# Patient Record
Sex: MALE | Race: WHITE | NOT HISPANIC OR LATINO | Employment: FULL TIME | URBAN - METROPOLITAN AREA
[De-identification: names, ages, dates, MRNs, and addresses within clinical notes are randomized per-mention and may not be internally consistent; named-entity substitution may affect disease eponyms.]

---

## 2017-09-21 ENCOUNTER — ALLSCRIPTS OFFICE VISIT (OUTPATIENT)
Dept: OTHER | Facility: OTHER | Age: 52
End: 2017-09-21

## 2017-09-21 DIAGNOSIS — E78.5 HYPERLIPIDEMIA: ICD-10-CM

## 2018-01-13 VITALS
SYSTOLIC BLOOD PRESSURE: 118 MMHG | HEIGHT: 66 IN | WEIGHT: 188 LBS | BODY MASS INDEX: 30.22 KG/M2 | DIASTOLIC BLOOD PRESSURE: 82 MMHG

## 2018-01-16 NOTE — PROGRESS NOTES
Assessment    1  Encounter for preventive health examination (V70 0) (Z00 00)   2  Encounter for screening colonoscopy (V76 51) (Z12 11)    Plan  Elevated liver enzymes, Encounter for screening colonoscopy    · COLONOSCOPY; Status:Active; Requested for:09Nov2016;    · 1 Laura Newell MD, Gaye Witt  (Gastroenterology) Physician Referral  Consult  Status: Active   Requested for: [id number]  Care Summary provided  : Yes  Health Maintenance    · (1) COMPREHENSIVE METABOLIC PANEL; Status:Active; Requested for:09Nov2016;    · * XR RIBS LEFT W PA CHEST MIN 3 VIEWS; Status:Active; Requested for:09Nov2016;    · * XR SPINE THORACIC 3 VIEW; Status:Active; Requested for:09Nov2016; Health Maintenance, Encounter for prostate cancer screening    · (1) PSA (SCREEN) (Dx V76 44 Screen for Prostate Cancer); Status:Active; Requested  for:09Nov2016; Health Maintenance, Hyperlipidemia    · (1) LIPID PANEL, FASTING; Status:Active; Requested for:09Nov2016;   Living will, counseling/discussion, Screening for depression    · We recommend that you create an advance directive ; Status:Complete - Retrospective  By Protocol Authorization;   Done: [id number]  Screening for depression    · *VB-Depression Screening; Status:Complete - Retrospective By Protocol Authorization;    Done: [id number] 07:43AM    Discussion/Summary  Impression: health maintenance visit  Currently, he eats a healthy diet  Prostate cancer screening: PSA was ordered  Testicular cancer screening: the risks and benefits of testicular cancer screening were discussed and self testicular exam technique was taught  Colorectal cancer screening: the risks and benefits of colorectal cancer screening were discussed  Chief Complaint    1  Back Pain    History of Present Illness  HM, Adult Male: The patient is being seen for a health maintenance evaluation  The last health maintenance visit was 2 year(s) ago  Social History: He is unmarried   Work status: working full time and occupation: restaurant owner  The patient has never smoked cigarettes  He reports rare alcohol use  He has never used illicit drugs  General Health: The patient's health since the last visit is described as good  He has regular dental visits  He denies vision problems  He denies hearing loss  Lifestyle:  He consumes a diverse and healthy diet  He does not have any weight concerns  He exercises regularly  He does not use tobacco  He consumes alcohol  He denies drug use  Reproductive health:  the patient is sexually active  birth control is not being practiced  He denies erectile dysfunction  Screening: cancer screening reviewed and current  Prostate cancer screening includes prostate-specific antigen testing last year  Testicular cancer screening includes monthly self testicular examinations  Colorectal cancer screening includes no previous screening  metabolic screening reviewed and current  Metabolic screening includes lipid profile performed within the past five years, glucose screening performed last year and thyroid function test performed last year  risk screening reviewed and current  Cardiovascular risk factors: no hypertension, no diabetes, no high LDL cholesterol, no low HDL cholesterol, no stress, no obesity, no tobacco use, no illicit drug use, no sedentary lifestyle and no family history of cardiovascular disease  General health risks: no elevated prostate-specific antigen, no undescended testis, no family history of prostate cancer, no previous colon polyps, no inflammatory bowel disease, no osteoporosis risk factors, no asbestos exposure, no radiation exposure and no occupational exposure to     Safety elements used: seat belt, bicycle helmet, safe driving habits, sunscreen, smoke detector, carbon monoxide detector, hot water temperature less than 120 degrees F, bathroom grab bars, fall prevention measures, CPR training for the patient and CPR training for household members, but no motorcycle helmet, no gun trigger locks and no gun safe  Back Pain: ALEX RUEDA presents with complaints of back pain  Associated symptoms include no spasm, no stiffness, no fever, no night sweats, no abdominal pain, no general malaise, no weight loss and no arm numbness  Review of Systems    Constitutional: No fever or chills, feels well, no tiredness, no recent weight gain or weight loss  Eyes: No complaints of eye pain, no red eyes, no discharge from eyes, no itchy eyes  ENT: no complaints of earache, no hearing loss, no nosebleeds, no nasal discharge, no sore throat, no hoarseness  Cardiovascular: No complaints of slow heart rate, no fast heart rate, no chest pain, no palpitations, no leg claudication, no lower extremity  Respiratory: No complaints of shortness of breath, no wheezing, no cough, no SOB on exertion, no orthopnea or PND  Gastrointestinal: No complaints of abdominal pain, no constipation, no nausea or vomiting, no diarrhea or bloody stools  Genitourinary: No complaints of dysuria, no incontinence, no hesitancy, no nocturia, no genital lesion, no testicular pain  Musculoskeletal: Point tenderness in the left posterior thorax at about the level of the fourth rib  Integumentary: No complaints of skin rash or skin lesions, no itching, no skin wound, no dry skin  Neurological: No compliants of headache, no confusion, no convulsions, no numbness or tingling, no dizziness or fainting, no limb weakness, no difficulty walking  Psychiatric: Is not suicidal, no sleep disturbances, no anxiety or depression, no change in personality, no emotional problems  Endocrine: No complaints of proptosis, no hot flashes, no muscle weakness, no erectile dysfunction, no deepening of the voice, no feelings of weakness  Hematologic/Lymphatic: No complaints of swollen glands, no swollen glands in the neck, does not bleed easily, no easy bruising  ROS reviewed  Active Problems    1   Elevated liver enzymes (790 5) (R74 8)   2  High risk medication use (V58 69) (Z79 899)   3  Hyperlipidemia (272 4) (E78 5)   4  Living will, counseling/discussion (V65 49) (Z71 89)   5  Need for influenza vaccination (V04 81) (Z23)   6  Pain of left side of body (780 96) (R52)   7  Postprandial abdominal bloating (787 3) (R14 0)   8  Screening for depression (V79 0) (Z13 89)    Past Medical History    · History of Atypical chest pain (786 59) (R07 89)    Surgical History    · History Of Prior Surgery    Family History  Mother    · Family history of hypertension (V17 49) (Z82 49)  Family History    · Family history of hyperlipidemia (V18 19) (Z83 49)    Social History    · Former smoker (V15 82) (R06 393)   · No living will   · Stopped Drinking Alcohol    Current Meds   1  Simvastatin 20 MG Oral Tablet; Take 1 tablet daily; Therapy: 92HAO0301 to (Evaluate:78Nym8416)  Requested for: 70VGF2308; Last   Rx:58Lwo1671 Ordered    Allergies    1  Aspirin TABS   2  Penicillins    Vitals   Recorded: 28FLW7886 15:67JM   Systolic 532, LUE, Sitting   Diastolic 76, LUE, Sitting   Height 5 ft 6 in   Weight 189 lb 4 00 oz   BMI Calculated 30 55   BSA Calculated 1 96     Physical Exam    Constitutional   General appearance: No acute distress, well appearing and well nourished  Eyes   Conjunctiva and lids: No swelling, erythema, or discharge  Pupils and irises: Equal, round and reactive to light  Ears, Nose, Mouth, and Throat   External inspection of ears and nose: Normal     Otoscopic examination: Tympanic membrance translucent with normal light reflex  Canals patent without erythema  Oropharynx: Normal with no erythema, edema, exudate or lesions  Pulmonary   Respiratory effort: No increased work of breathing or signs of respiratory distress  Auscultation of lungs: Clear to auscultation  Cardiovascular   Palpation of heart: Normal PMI, no thrills      Auscultation of heart: Normal rate and rhythm, normal S1 and S2, without murmurs  Examination of extremities for edema and/or varicosities: Normal     Abdomen   Abdomen: Non-tender, no masses  Liver and spleen: No hepatomegaly or splenomegaly  Lymphatic   Palpation of lymph nodes in neck: No lymphadenopathy  Musculoskeletal   Gait and station: Normal     Digits and nails: Normal without clubbing or cyanosis  Inspection/palpation of joints, bones, and muscles: Normal     Skin   Skin and subcutaneous tissue: Normal without rashes or lesions  Neurologic   Cranial nerves: Cranial nerves 2-12 intact  Reflexes: 2+ and symmetric  Sensation: No sensory loss      Psychiatric   Orientation to person, place and time: Normal     Mood and affect: Normal        Results/Data  *VB-Depression Screening [id number] 07:43AM Cory Desouza     Test Name Result Flag Reference   Depression Scale Result      Depression Screen - Negative For Symptoms       Signatures   Electronically signed by : Kimberli Bailey DO; Nov 9 2016  8:30AM EST                       (Author)

## 2018-01-17 NOTE — RESULT NOTES
Verified Results  * XR RIBS LEFT W PA CHEST MIN 3 VIEWS [id number] 08:47AM Oziel Rebolledo    Order Number: [id number]     Test Name Result Flag Reference   XR RIBS LEFT W PA CHEST MIN 3 VIEWS (Report)     LEFT RIBS AND CHEST     INDICATION: Left rib pain  COMPARISON: March 4, 2015     VIEWS: Frontal chest and 3 views left hemithorax; 4 images     FINDINGS:     The cardiomediastinal silhouette is unremarkable  Lungs are clear  No pleural effusions  There is no pneumothorax  No rib fractures are identified  Mild degenerative changes, thoracolumbar spine  IMPRESSION:     1  No active pulmonary disease  2  No evidence of rib fractures  Workstation performed: [id number]     Signed by:   Dona Jacobs MD   11/9/16

## 2018-02-13 ENCOUNTER — TELEPHONE (OUTPATIENT)
Dept: FAMILY MEDICINE CLINIC | Facility: CLINIC | Age: 53
End: 2018-02-13

## 2018-02-13 NOTE — TELEPHONE ENCOUNTER
Pt called about lab results which can now be found under Media - Nothing was outstanding in the results except for the ALT which was elevated 60 (range 9-46)    DOES DR HAVE ANY MESSAGE OR RECOMMENDATION FOR PT?

## 2018-02-14 NOTE — TELEPHONE ENCOUNTER
Labs were all good of the minor elevation in ALT is nothing to be concerned about so he should continue same meds repeat these lab values in a year

## 2018-03-02 DIAGNOSIS — E78.5 HYPERLIPIDEMIA, UNSPECIFIED HYPERLIPIDEMIA TYPE: Primary | ICD-10-CM

## 2018-03-02 RX ORDER — SIMVASTATIN 20 MG
1 TABLET ORAL DAILY
COMMUNITY
Start: 2011-05-26 | End: 2018-03-02 | Stop reason: SDUPTHER

## 2018-03-02 RX ORDER — SIMVASTATIN 20 MG
20 TABLET ORAL DAILY
Qty: 90 TABLET | Refills: 1 | Status: SHIPPED | OUTPATIENT
Start: 2018-03-02 | End: 2018-11-21 | Stop reason: SDUPTHER

## 2018-03-27 ENCOUNTER — APPOINTMENT (EMERGENCY)
Dept: CT IMAGING | Facility: HOSPITAL | Age: 53
End: 2018-03-27
Payer: COMMERCIAL

## 2018-03-27 ENCOUNTER — HOSPITAL ENCOUNTER (EMERGENCY)
Facility: HOSPITAL | Age: 53
Discharge: HOME | End: 2018-03-27
Attending: EMERGENCY MEDICINE
Payer: COMMERCIAL

## 2018-03-27 ENCOUNTER — APPOINTMENT (EMERGENCY)
Dept: RADIOLOGY | Facility: HOSPITAL | Age: 53
End: 2018-03-27
Payer: COMMERCIAL

## 2018-03-27 VITALS
HEART RATE: 64 BPM | HEIGHT: 65 IN | BODY MASS INDEX: 32.36 KG/M2 | SYSTOLIC BLOOD PRESSURE: 128 MMHG | WEIGHT: 194.2 LBS | DIASTOLIC BLOOD PRESSURE: 82 MMHG | OXYGEN SATURATION: 96 % | RESPIRATION RATE: 18 BRPM | TEMPERATURE: 98.6 F

## 2018-03-27 DIAGNOSIS — J32.9 SINUSITIS: ICD-10-CM

## 2018-03-27 DIAGNOSIS — R42 VERTIGO: Primary | ICD-10-CM

## 2018-03-27 LAB
ALBUMIN SERPL BCP-MCNC: 4.2 G/DL (ref 3.5–5)
ALP SERPL-CCNC: 66 U/L (ref 46–116)
ALT SERPL W P-5'-P-CCNC: 98 U/L (ref 12–78)
ANION GAP SERPL CALCULATED.3IONS-SCNC: 8 MMOL/L (ref 4–13)
AST SERPL W P-5'-P-CCNC: 43 U/L (ref 5–45)
BASOPHILS # BLD AUTO: 0.02 THOUSANDS/ΜL (ref 0–0.1)
BASOPHILS NFR BLD AUTO: 0 % (ref 0–1)
BILIRUB SERPL-MCNC: 0.7 MG/DL (ref 0.2–1)
BUN SERPL-MCNC: 14 MG/DL (ref 5–25)
CALCIUM SERPL-MCNC: 9.2 MG/DL (ref 8.3–10.1)
CHLORIDE SERPL-SCNC: 106 MMOL/L (ref 100–108)
CO2 SERPL-SCNC: 28 MMOL/L (ref 21–32)
CREAT SERPL-MCNC: 1.06 MG/DL (ref 0.6–1.3)
EOSINOPHIL # BLD AUTO: 0.07 THOUSAND/ΜL (ref 0–0.61)
EOSINOPHIL NFR BLD AUTO: 1 % (ref 0–6)
ERYTHROCYTE [DISTWIDTH] IN BLOOD BY AUTOMATED COUNT: 13.5 % (ref 11.6–15.1)
GFR SERPL CREATININE-BSD FRML MDRD: 81 ML/MIN/1.73SQ M
GLUCOSE SERPL-MCNC: 103 MG/DL (ref 65–140)
HCT VFR BLD AUTO: 45.9 % (ref 36.5–49.3)
HGB BLD-MCNC: 16.8 G/DL (ref 12–17)
LYMPHOCYTES # BLD AUTO: 1.07 THOUSANDS/ΜL (ref 0.6–4.47)
LYMPHOCYTES NFR BLD AUTO: 12 % (ref 14–44)
MCH RBC QN AUTO: 31.5 PG (ref 26.8–34.3)
MCHC RBC AUTO-ENTMCNC: 36.6 G/DL (ref 31.4–37.4)
MCV RBC AUTO: 86 FL (ref 82–98)
MONOCYTES # BLD AUTO: 0.36 THOUSAND/ΜL (ref 0.17–1.22)
MONOCYTES NFR BLD AUTO: 4 % (ref 4–12)
NEUTROPHILS # BLD AUTO: 7.57 THOUSANDS/ΜL (ref 1.85–7.62)
NEUTS SEG NFR BLD AUTO: 83 % (ref 43–75)
PLATELET # BLD AUTO: 180 THOUSANDS/UL (ref 149–390)
PMV BLD AUTO: 10.1 FL (ref 8.9–12.7)
POTASSIUM SERPL-SCNC: 4.7 MMOL/L (ref 3.5–5.3)
PROT SERPL-MCNC: 7.8 G/DL (ref 6.4–8.2)
RBC # BLD AUTO: 5.33 MILLION/UL (ref 3.88–5.62)
SODIUM SERPL-SCNC: 142 MMOL/L (ref 136–145)
TROPONIN I SERPL-MCNC: <0.02 NG/ML
WBC # BLD AUTO: 9.09 THOUSAND/UL (ref 4.31–10.16)

## 2018-03-27 PROCEDURE — 93005 ELECTROCARDIOGRAM TRACING: CPT | Performed by: EMERGENCY MEDICINE

## 2018-03-27 PROCEDURE — 70498 CT ANGIOGRAPHY NECK: CPT

## 2018-03-27 PROCEDURE — 84484 ASSAY OF TROPONIN QUANT: CPT | Performed by: EMERGENCY MEDICINE

## 2018-03-27 PROCEDURE — 71045 X-RAY EXAM CHEST 1 VIEW: CPT

## 2018-03-27 PROCEDURE — 70496 CT ANGIOGRAPHY HEAD: CPT

## 2018-03-27 PROCEDURE — 99285 EMERGENCY DEPT VISIT HI MDM: CPT

## 2018-03-27 PROCEDURE — 85025 COMPLETE CBC W/AUTO DIFF WBC: CPT | Performed by: EMERGENCY MEDICINE

## 2018-03-27 PROCEDURE — 80053 COMPREHEN METABOLIC PANEL: CPT | Performed by: EMERGENCY MEDICINE

## 2018-03-27 PROCEDURE — 96374 THER/PROPH/DIAG INJ IV PUSH: CPT

## 2018-03-27 RX ORDER — ONDANSETRON 4 MG/1
4 TABLET, ORALLY DISINTEGRATING ORAL EVERY 8 HOURS PRN
Qty: 20 TABLET | Refills: 0 | Status: SHIPPED | OUTPATIENT
Start: 2018-03-27 | End: 2018-05-07

## 2018-03-27 RX ORDER — MECLIZINE HYDROCHLORIDE 25 MG/1
25 TABLET ORAL 4 TIMES DAILY PRN
Qty: 30 TABLET | Refills: 0 | Status: SHIPPED | OUTPATIENT
Start: 2018-03-27 | End: 2018-05-07

## 2018-03-27 RX ORDER — AZITHROMYCIN 250 MG/1
250 TABLET, FILM COATED ORAL DAILY
Qty: 4 TABLET | Refills: 0 | Status: SHIPPED | OUTPATIENT
Start: 2018-03-27 | End: 2018-03-31

## 2018-03-27 RX ORDER — MECLIZINE HCL 12.5 MG/1
25 TABLET ORAL ONCE
Status: COMPLETED | OUTPATIENT
Start: 2018-03-27 | End: 2018-03-27

## 2018-03-27 RX ORDER — AZITHROMYCIN 250 MG/1
500 TABLET, FILM COATED ORAL ONCE
Status: COMPLETED | OUTPATIENT
Start: 2018-03-27 | End: 2018-03-27

## 2018-03-27 RX ORDER — ONDANSETRON 2 MG/ML
4 INJECTION INTRAMUSCULAR; INTRAVENOUS ONCE
Status: COMPLETED | OUTPATIENT
Start: 2018-03-27 | End: 2018-03-27

## 2018-03-27 RX ADMIN — IOHEXOL 100 ML: 350 INJECTION, SOLUTION INTRAVENOUS at 13:38

## 2018-03-27 RX ADMIN — AZITHROMYCIN 500 MG: 250 TABLET, FILM COATED ORAL at 15:49

## 2018-03-27 RX ADMIN — MECLIZINE 25 MG: 12.5 TABLET ORAL at 12:03

## 2018-03-27 RX ADMIN — ONDANSETRON 4 MG: 2 INJECTION INTRAMUSCULAR; INTRAVENOUS at 12:10

## 2018-03-27 NOTE — ED PROVIDER NOTES
History  Chief Complaint   Patient presents with    Dizziness     Pt reports dizziness when turning his head  Pt reports nausea  Denies headache     73-year-old male right-hand-dominant was in usual state of health when he went to bed last night  He awoke in the middle the night noted that when he turned his head removed his eyes he had onset of a spinning sensation denies any falls or trauma no headache  No visual disturbance  No recent upper respiratory complaints such as congestion cough or sore throat  Denies any prior history of vertigo  He was mildly nauseated no vomiting  No complaints of problems with speech or hearing  No chest pain  Slight neck disccomfort  He denies any back pain  No chest pain he has noted that he has had some slight shortness of breath  No abdominal pain no back pain no numbness tingling or weakness no difficulties with urination no fever or chills  Prior to Admission Medications   Prescriptions Last Dose Informant Patient Reported? Taking?   simvastatin (ZOCOR) 20 mg tablet 3/26/2018 at Unknown time  No Yes   Sig: Take 1 tablet (20 mg total) by mouth daily   Patient taking differently: Take 20 mg by mouth daily at bedtime        Facility-Administered Medications: None       Past Medical History:   Diagnosis Date    Hyperlipidemia        Past Surgical History:   Procedure Laterality Date    NO PAST SURGERIES         History reviewed  No pertinent family history  I have reviewed and agree with the history as documented  Social History   Substance Use Topics    Smoking status: Former Smoker    Smokeless tobacco: Never Used    Alcohol use No        Review of Systems   Constitutional: Negative for activity change, chills, diaphoresis and fever  HENT: Negative for congestion, ear pain, hearing loss, rhinorrhea, sneezing, sore throat, trouble swallowing and voice change  Eyes: Negative for discharge and visual disturbance     Respiratory: Negative for cough and shortness of breath  Cardiovascular: Negative for chest pain and leg swelling  Gastrointestinal: Positive for nausea  Negative for abdominal pain, blood in stool, diarrhea and vomiting  Endocrine: Negative for polyuria  Genitourinary: Negative for dysuria, frequency and urgency  Musculoskeletal: Positive for neck pain (mild)  Negative for back pain, myalgias and neck stiffness  Skin: Negative for rash  Neurological: Positive for dizziness  Negative for facial asymmetry, numbness and headaches  Hematological: Negative for adenopathy  Psychiatric/Behavioral: Negative for confusion  All other systems reviewed and are negative  Physical Exam  ED Triage Vitals [03/27/18 1106]   Temperature Pulse Respirations Blood Pressure SpO2   98 6 °F (37 °C) 64 17 144/86 97 %      Temp Source Heart Rate Source Patient Position - Orthostatic VS BP Location FiO2 (%)   Temporal Monitor Lying Left arm --      Pain Score       No Pain           Orthostatic Vital Signs  Vitals:    03/27/18 1400 03/27/18 1430 03/27/18 1500 03/27/18 1600   BP: 125/82 118/78 115/77 128/82   Pulse: 71 60 56 64   Patient Position - Orthostatic VS: Lying Lying Sitting Lying       Physical Exam   Constitutional: He is oriented to person, place, and time  He appears well-developed  HENT:   Head: Normocephalic  Right Ear: External ear normal    Left Ear: External ear normal    Nose: Nose normal    Mouth/Throat: Oropharynx is clear and moist  No oropharyngeal exudate  Auditory canals free of wax; TMS pale bilaterally   Eyes: Conjunctivae and EOM are normal  Pupils are equal, round, and reactive to light  Right eye exhibits no discharge  Left eye exhibits no discharge  No scleral icterus  No nystagmus   Neck: Normal range of motion  Neck supple  No bruits   Cardiovascular: Normal rate, regular rhythm and normal heart sounds  No murmur heard  Pulmonary/Chest: Effort normal and breath sounds normal  No respiratory distress   He has no wheezes  Abdominal: Soft  Bowel sounds are normal  He exhibits no distension  There is no tenderness  There is no rebound and no guarding  Back no midline or CVA tenderness   Lymphadenopathy:     He has no cervical adenopathy  Neurological: He is alert and oriented to person, place, and time  He displays normal reflexes  No cranial nerve deficit or sensory deficit  He exhibits normal muscle tone  Coordination normal    Skin: Skin is warm and dry  Capillary refill takes less than 2 seconds  No erythema  Psychiatric: He has a normal mood and affect  Vitals reviewed  ED Medications  Medications   ondansetron (ZOFRAN) injection 4 mg (4 mg Intravenous Given 3/27/18 1210)   meclizine (ANTIVERT) tablet 25 mg (25 mg Oral Given 3/27/18 1203)   iohexol (OMNIPAQUE) 350 MG/ML injection (MULTI-DOSE) 100 mL (100 mL Intravenous Given 3/27/18 1338)   azithromycin (ZITHROMAX) tablet 500 mg (500 mg Oral Given 3/27/18 1549)       Diagnostic Studies  Results Reviewed     Procedure Component Value Units Date/Time    Troponin I [[id number]]  (Normal) Collected:  03/27/18 1209    Lab Status:  Final result Specimen:  Blood from Arm, Right Updated:  03/27/18 1234     Troponin I <0 02 ng/mL     Narrative:         [employer] Chemistry analyzer 99% cutoff is > 0 04 ng/mL in network labs    o cTnI 99% cutoff is useful only when applied to patients in the clinical setting of myocardial ischemia  o cTnI 99% cutoff should be interpreted in the context of clinical history, ECG findings and possibly cardiac imaging to establish correct diagnosis  o cTnI 99% cutoff may be suggestive but clearly not indicative of a coronary event without the clinical setting of myocardial ischemia      Comprehensive metabolic panel [[id number]]  (Abnormal) Collected:  03/27/18 1209    Lab Status:  Final result Specimen:  Blood from Arm, Right Updated:  03/27/18 1232     Sodium 142 mmol/L      Potassium 4 7 mmol/L      Chloride 106 mmol/L      CO2 28 mmol/L      Anion Gap 8 mmol/L      BUN 14 mg/dL      Creatinine 1 06 mg/dL      Glucose 103 mg/dL      Calcium 9 2 mg/dL      AST 43 U/L      ALT 98 (H) U/L      Alkaline Phosphatase 66 U/L      Total Protein 7 8 g/dL      Albumin 4 2 g/dL      Total Bilirubin 0 70 mg/dL      eGFR 81 ml/min/1 73sq m     Narrative:         National Kidney Disease Education Program recommendations are as follows:  GFR calculation is accurate only with a steady state creatinine  Chronic Kidney disease less than 60 ml/min/1 73 sq  meters  Kidney failure less than 15 ml/min/1 73 sq  meters  CBC and differential [[id number]]  (Abnormal) Collected:  03/27/18 1209    Lab Status:  Final result Specimen:  Blood from Arm, Right Updated:  03/27/18 1218     WBC 9 09 Thousand/uL      RBC 5 33 Million/uL      Hemoglobin 16 8 g/dL      Hematocrit 45 9 %      MCV 86 fL      MCH 31 5 pg      MCHC 36 6 g/dL      RDW 13 5 %      MPV 10 1 fL      Platelets 337 Thousands/uL      Neutrophils Relative 83 (H) %      Lymphocytes Relative 12 (L) %      Monocytes Relative 4 %      Eosinophils Relative 1 %      Basophils Relative 0 %      Neutrophils Absolute 7 57 Thousands/µL      Lymphocytes Absolute 1 07 Thousands/µL      Monocytes Absolute 0 36 Thousand/µL      Eosinophils Absolute 0 07 Thousand/µL      Basophils Absolute 0 02 Thousands/µL                  CTA head and neck with and without contrast   Final Result by Venus Staley MD (03/27 0132)   No focal brain parenchymal abnormalities      No evidence of significant stenosis, dissection or occlusion involving visualized cerebral arteries or cervical carotid and vertebral segments      Right ethmoid and frontal sinus disease including probable right frontal air-fluid level                  Workstation performed: [id number]         XR chest 1 view portable   Final Result by JOHN Monte MD (03/27 5441)      No acute cardiopulmonary disease              Workstation performed: [id number] Procedures  ECG 12 Lead Documentation  Date/Time: 3/27/2018 12:09 PM  Performed by: Renee Harris by: Radha Triana     Indications / Diagnosis:  Vertigo  ECG reviewed by me, the ED Provider: yes    Patient location:  ED  Previous ECG:     Previous ECG:  Unavailable  Rate:     ECG rate:  58    ECG rate assessment: bradycardic    Rhythm:     Rhythm: sinus bradycardia    QRS:     QRS axis:  Normal  Comments:      Nonspecific twave changes           Phone Contacts  ED Phone Contact    ED Course  ED Course              NIH Stroke Scale    Flowsheet Row Most Recent Value   Level of Consciousness (1a )  0 Filed at: 03/27/2018 1153   LOC Questions (1b )  0 Filed at: 03/27/2018 1153   LOC Commands (1c )  0 Filed at: 03/27/2018 1153   Best Gaze (2 )  0 Filed at: 03/27/2018 1153   Visual (3 )  0 Filed at: 03/27/2018 1153   Facial Palsy (4 )  0 Filed at: 03/27/2018 1153   Motor Arm, Left (5a )  0 Filed at: 03/27/2018 1153   Motor Arm, Right (5b )  0 Filed at: 03/27/2018 1153   Motor Leg, Left (6a )  0 Filed at: 03/27/2018 1153   Motor Leg, Right (6b )  0 Filed at: 03/27/2018 1153   Limb Ataxia (7 )  0 Filed at: 03/27/2018 1153   Sensory (8 )  0 Filed at: 03/27/2018 1153   Best Language (9 )  0 Filed at: 03/27/2018 1153   Dysarthria (10 )  0 Filed at: 03/27/2018 1153   Extinction and Inattention (11 ) (Formerly Neglect)  0 Filed at: 03/27/2018 1153   Total  0 Filed at: 03/27/2018 1153                        MDM  Number of Diagnoses or Management Options  Diagnosis management comments: Mdm: vertigo, CVA less likely    CritCare Time    Disposition  Final diagnoses:   Vertigo   Sinusitis - frontal and ethmoid right     Time reflects when diagnosis was documented in both MDM as applicable and the Disposition within this note     Time User Action Codes Description Comment    3/27/2018  3:31 PM Bill Crumble Add [R42] Vertigo     3/27/2018  3:31 PM Bill Crumble Add [J32 9] Sinusitis 3/27/2018  3:32 PM Alba Black Modify [J32 9] Sinusitis frontal and ethmoid right      ED Disposition     ED Disposition Condition Comment    Discharge  Darwin Arellano discharge to home/self care  Condition at discharge: Good        Follow-up Information     Follow up With Specialties Details Why [address], DO Family Medicine In 1 week not improviing [address] [address]  [address]  [phone]          Discharge Medication List as of 3/27/2018  3:39 PM      START taking these medications    Details   azithromycin (ZITHROMAX Z-DAVIAN) 250 mg tablet Take 1 tablet (250 mg total) by mouth daily for 4 days, Starting Tue 3/27/2018, Until Sat 3/31/2018, Print      meclizine (ANTIVERT) 25 mg tablet Take 1 tablet (25 mg total) by mouth 4 (four) times a day as needed for dizziness, Starting Tue 3/27/2018, Print      ondansetron (ZOFRAN-ODT) 4 mg disintegrating tablet Take 1 tablet (4 mg total) by mouth every 8 (eight) hours as needed for nausea or vomiting for up to 20 doses, Starting Tue 3/27/2018, Print         CONTINUE these medications which have NOT CHANGED    Details   simvastatin (ZOCOR) 20 mg tablet Take 1 tablet (20 mg total) by mouth daily, Starting Fri 3/2/2018, Normal             Outpatient Discharge Orders  PT eval and treat   Standing Status: Future  Standing Exp   Date: 03/28/19         ED Provider  Electronically Signed by           Jg Wolf MD  03/27/18 8921

## 2018-03-27 NOTE — DISCHARGE INSTRUCTIONS
Vertigo   AMBULATORY CARE:   Vertigo  is a condition that causes you to feel dizzy  You may feel that you or everything around you is moving or spinning  You may also feel like you are being pulled down or toward your side  Common symptoms that may happen with vertigo:   · Nausea or vomiting    · Trouble with your balance    · Sensitivity to light or sound    · Weakness, slurred speech, problems seeing or moving, or increased sleepiness    · Facial weakness and headache    · Hearing loss, ear fullness or pain, or hearing ringing sounds    · Fast, uncontrolled movement of your eyes  Seek care immediately if:   · You have a headache and a stiff neck  · You have shaking chills and a fever  · You vomit over and over with no relief  · You have blood, pus, or fluid coming out of your ears  · You are confused  Contact your healthcare provider if:   · Your symptoms do not get better with treatment  · You have questions about your condition or care  Treatment for vertigo  may include resting in bed or avoid certain activities for a time  You may need to decrease or stop taking medicines that are causing your vertigo  Medicines may also be prescribed to help relieve your symptoms  Manage your symptoms:   · Do not drive , walk without help, or operate heavy machinery when you are dizzy  · Move slowly  when you move from one position to another position  Get up slowly from sitting or lying down  Sit or lie down right away if you feel dizzy  · Drink plenty of liquids  Liquids help prevent dehydration  Ask how much liquid to drink each day and which liquids are best for you  · Vestibular and balance rehabilitation therapy (VBRT)  is used to teach you exercises to improve your balance and strength  These exercises may help decrease your vertigo and improve your balance  Ask for more information about this therapy    Follow up with your healthcare provider as directed:  Write down your questions so you remember to ask them during your visits  © 2017 [address] [employer] Information is for End User's use only and may not be sold, redistributed or otherwise used for commercial purposes  All illustrations and images included in CareNotes® are the copyrighted property of [employer]  or [employer]  The above information is an  only  It is not intended as medical advice for individual conditions or treatments  Talk to your doctor, nurse or pharmacist before following any medical regimen to see if it is safe and effective for you  Sinusitis   WHAT YOU NEED TO KNOW:   Sinusitis is inflammation or infection of your sinuses  It is most often caused by a virus  Acute sinusitis may last up to 12 weeks  Chronic sinusitis lasts longer than 12 weeks  Recurrent sinusitis means you have 4 or more times in 1 year  DISCHARGE INSTRUCTIONS:   Return to the emergency department if:   · Your eye and eyelid are red, swollen, and painful  · You cannot open your eye  · You have vision changes, such as double vision  · Your eyeball bulges out or you cannot move your eye  · You are more sleepy than normal, or you notice changes in your ability to think, move, or talk  · You have a stiff neck, a fever, or a bad headache  · You have swelling of your forehead or scalp  Contact your healthcare provider if:   · Your symptoms do not improve after 3 days  · Your symptoms do not go away after 10 days  · You have nausea and are vomiting  · Your nose is bleeding  · You have questions or concerns about your condition or care  Medicines: Your symptoms may go away on their own  Your healthcare provider may recommend watchful waiting for up to 10 days before starting antibiotics  You may  need any of the following:  · Acetaminophen  decreases pain and fever  It is available without a doctor's order  Ask how much to take and how often to take it   Follow directions  Read the labels of all other medicines you are using to see if they also contain acetaminophen, or ask your doctor or pharmacist  Acetaminophen can cause liver damage if not taken correctly  Do not use more than 4 grams (4,000 milligrams) total of acetaminophen in one day  · NSAIDs , such as ibuprofen, help decrease swelling, pain, and fever  This medicine is available with or without a doctor's order  NSAIDs can cause stomach bleeding or kidney problems in certain people  If you take blood thinner medicine, always ask your healthcare provider if NSAIDs are safe for you  Always read the medicine label and follow directions  · Nasal steroid sprays  may help decrease inflammation in your nose and sinuses  · Decongestants  help reduce swelling and drain mucus in the nose and sinuses  They may help you breathe easier  · Antihistamines  help dry mucus in the nose and relieve sneezing  · Antibiotics  help treat or prevent a bacterial infection  · Take your medicine as directed  Contact your healthcare provider if you think your medicine is not helping or if you have side effects  Tell him or her if you are allergic to any medicine  Keep a list of the medicines, vitamins, and herbs you take  Include the amounts, and when and why you take them  Bring the list or the pill bottles to follow-up visits  Carry your medicine list with you in case of an emergency  Self-care:   · Rinse your sinuses  Use a sinus rinse device to rinse your nasal passages with a saline (salt water) solution or distilled water  Do not use tap water  This will help thin the mucus in your nose and rinse away pollen and dirt  It will also help reduce swelling so you can breathe normally  Ask your healthcare provider how often to do this  · Breathe in steam   Heat a bowl of water until you see steam  Lean over the bowl and make a tent over your head with a large towel  Breathe deeply for about 20 minutes   Be careful not to get too close to the steam or burn yourself  Do this 3 times a day  You can also breathe deeply when you take a hot shower  · Sleep with your head elevated  Place an extra pillow under your head before you go to sleep to help your sinuses drain  · Drink liquids as directed  Ask your healthcare provider how much liquid to drink each day and which liquids are best for you  Liquids will thin the mucus in your nose and help it drain  Avoid drinks that contain alcohol or caffeine  · Do not smoke, and avoid secondhand smoke  Nicotine and other chemicals in cigarettes and cigars can make your symptoms worse  Ask your healthcare provider for information if you currently smoke and need help to quit  E-cigarettes or smokeless tobacco still contain nicotine  Talk to your healthcare provider before you use these products  Prevent the spread of germs that cause sinusitis:  Wash your hands often with soap and water  Wash your hands after you use the bathroom, change a child's diaper, or sneeze  Wash your hands before you prepare or eat food  Follow up with your healthcare provider as directed: You may be referred to an ear, nose, and throat specialist  Write down your questions so you remember to ask them during your visits  © 2017 [facility] Information is for End User's use only and may not be sold, redistributed or otherwise used for commercial purposes  All illustrations and images included in CareNotes® are the copyrighted property of [employer]  or [employer]  The above information is an  only  It is not intended as medical advice for individual conditions or treatments  Talk to your doctor, nurse or pharmacist before following any medical regimen to see if it is safe and effective for you    No driving until free of sx for 48 hours  You tube epley maneuver      Benign Paroxysmal Positional Vertigo   WHAT YOU NEED TO KNOW:   BPPV is an inner ear condition that causes you to suddenly feel dizzy  Benign means it is not serious or life-threatening  BPPV is caused by a problem with the nerves and structure of your inner ear  BPPV happens when small pieces of calcium break loose and lump together in one of your inner ear canals  DISCHARGE INSTRUCTIONS:   Return to the emergency department if:   · You fall during a BPPV episode and are injured  · You have a severe headache that does not go away  · You have new changes in your vision or feel weak or confused  · You have problems hearing, or you have ringing or buzzing in your ears  Contact your healthcare provider if:   · Your BPPV symptoms do not go away or they return  · You have problems with your balance, or you are falling often  · You have new or increased nausea or vomiting with vertigo  · You feel anxious or depressed and do not want to leave your home  · You have questions or concerns about your condition or care  Medicines:   · Medicines  may be recommended or prescribed to treat dizziness or nausea  · Take your medicine as directed  Contact your healthcare provider if you think your medicine is not helping or if you have side effects  Tell him of her if you are allergic to any medicine  Keep a list of the medicines, vitamins, and herbs you take  Include the amounts, and when and why you take them  Bring the list or the pill bottles to follow-up visits  Carry your medicine list with you in case of an emergency  Prevent your symptoms:   · Try to avoid sudden head movements  Stand up and lie down slowly  · Raise and support your head when you lie down  Place pillows under your upper back and head or rest in a recliner  · Change your position often when you are lying down  Try not to lie with your head on the same side for long periods of time  Roll over slowly  · Wear protective gear  when you ride a bike or play sports   A helmet helps protect your head from injury  Follow up with your healthcare provider as directed: You may need to return in 1 month to check the progress of your treatment  Write down your questions so you remember to ask them during your visits  © 2017 [address] [employer] Information is for End User's use only and may not be sold, redistributed or otherwise used for commercial purposes  All illustrations and images included in CareNotes® are the copyrighted property of [employer]  or [employer]  The above information is an  only  It is not intended as medical advice for individual conditions or treatments  Talk to your doctor, nurse or pharmacist before following any medical regimen to see if it is safe and effective for you

## 2018-03-28 LAB
ATRIAL RATE: 58 BPM
P AXIS: 18 DEGREES
PR INTERVAL: 172 MS
QRS AXIS: 17 DEGREES
QRSD INTERVAL: 84 MS
QT INTERVAL: 404 MS
QTC INTERVAL: 396 MS
T WAVE AXIS: -11 DEGREES
VENTRICULAR RATE: 58 BPM

## 2018-03-28 PROCEDURE — 93010 ELECTROCARDIOGRAM REPORT: CPT | Performed by: INTERNAL MEDICINE

## 2018-03-29 ENCOUNTER — VBI (OUTPATIENT)
Dept: ADMINISTRATIVE | Facility: OTHER | Age: 53
End: 2018-03-29

## 2018-03-29 NOTE — TELEPHONE ENCOUNTER
Darwingemini Arellano    ED Visit Information     Ed visit date: 3/27/18  Diagnosis Description: Vertigo; Sinusitis [frontal and ethmoid right]   In Network? Yes [facility]  Discharge status: Home  Discharged with meds ? Yes  Number of ED visits to date: 1  ED Severity:3     Outreach Information    Outreach successful: Yes 2  Date letter mailed:n/a  Date Finalized:3/29/18    Care Coordination    Follow up appointment with pcp: no declined  Transportation issues ? No    [employer] type:  3 Day Outreach  Patient refsued the answer questions:  No  Emergent necessity warranted by diagnosis:  Yes  [facility] PCP:  Yes  Transportation:  Friend/Family Transport  Called PCP first?:  No  Feels able to call PCP for urgent problems ?:  Yes  Understands what emergencies can be handled by PCP ?:  Yes  Ever any problems getting appointment with PCP for minor emergency/urgency problems?:  Yes  Practice Contacted Patient ?:  No  Pt had ED follow up with pcp/staff ?:  No    Reason Patient went to ED instead of Urgent Care or PCP?:  Proximity  Urgent care Education?:  Yes  Spoke with Ari Luo today 3/29/18,he stated that he is feeling better today, declined f/u with pcp  He also stated the ED was closer rather than Urgent care and he just needed to be seen due to his condition at the time

## 2018-05-07 ENCOUNTER — OFFICE VISIT (OUTPATIENT)
Dept: FAMILY MEDICINE CLINIC | Facility: CLINIC | Age: 53
End: 2018-05-07
Payer: COMMERCIAL

## 2018-05-07 VITALS
DIASTOLIC BLOOD PRESSURE: 70 MMHG | WEIGHT: 198.6 LBS | HEIGHT: 65 IN | BODY MASS INDEX: 33.09 KG/M2 | SYSTOLIC BLOOD PRESSURE: 120 MMHG

## 2018-05-07 DIAGNOSIS — H81.10 BENIGN PAROXYSMAL POSITIONAL VERTIGO, UNSPECIFIED LATERALITY: Primary | ICD-10-CM

## 2018-05-07 PROCEDURE — 99213 OFFICE O/P EST LOW 20 MIN: CPT | Performed by: PHYSICIAN ASSISTANT

## 2018-05-07 PROCEDURE — 3008F BODY MASS INDEX DOCD: CPT | Performed by: PHYSICIAN ASSISTANT

## 2018-05-07 NOTE — PROGRESS NOTES
Assessment/Plan:    Based on Darwin' symptoms and previously negative studies, recommending vestibular therapy for BPPV  He is in agreement  Diagnoses and all orders for this visit:    Benign paroxysmal positional vertigo, unspecified laterality  -     Ambulatory referral to Physical Therapy; Future          Subjective:      Patient ID: Gus Tesfaye is a 48 y o  male  Darwin is here today for continuing complaints of dizziness  Was at [facility] 3/27/18 and diagnosed with Vertigo  He had negative labs, CXR, EKG, and CTA head/neck  Given a prescription for meclizine and zofran  He never had to use the Zofran, and tried to use meclizine twice without relief  His mother and sister have H/O vertigo  Dizziness   This is a new problem  The current episode started more than 1 month ago  The problem occurs daily  The problem has been unchanged  Associated symptoms include vertigo  Pertinent negatives include no abdominal pain, anorexia, arthralgias, change in bowel habit, chest pain, chills, congestion, coughing, diaphoresis, fatigue, fever, headaches, joint swelling, myalgias, nausea, neck pain, numbness, rash, sore throat, swollen glands, urinary symptoms, visual change, vomiting or weakness  Exacerbated by: position changes or tilting head backwards  Treatments tried: meclizine  The treatment provided mild relief  The following portions of the patient's history were reviewed and updated as appropriate:   He  has a past medical history of Hyperlipidemia  He   Patient Active Problem List    Diagnosis Date Noted    Elevated liver enzymes 03/05/2015    Hyperlipidemia 06/07/2012     He  has a past surgical history that includes No past surgeries  His family history includes Hyperlipidemia in his family; Hypertension in his mother  He  reports that he has quit smoking  He has never used smokeless tobacco  He reports that he does not drink alcohol or use drugs    Current Outpatient Prescriptions   Medication Sig Dispense Refill    simvastatin (ZOCOR) 20 mg tablet Take 1 tablet (20 mg total) by mouth daily (Patient taking differently: Take 20 mg by mouth daily at bedtime  ) 90 tablet 1     No current facility-administered medications for this visit  Current Outpatient Prescriptions on File Prior to Visit   Medication Sig    simvastatin (ZOCOR) 20 mg tablet Take 1 tablet (20 mg total) by mouth daily (Patient taking differently: Take 20 mg by mouth daily at bedtime  )    [DISCONTINUED] meclizine (ANTIVERT) 25 mg tablet Take 1 tablet (25 mg total) by mouth 4 (four) times a day as needed for dizziness    [DISCONTINUED] ondansetron (ZOFRAN-ODT) 4 mg disintegrating tablet Take 1 tablet (4 mg total) by mouth every 8 (eight) hours as needed for nausea or vomiting for up to 20 doses     No current facility-administered medications on file prior to visit  He is allergic to aspirin and penicillins       Review of Systems   Constitutional: Negative for chills, diaphoresis, fatigue and fever  HENT: Negative for congestion, ear pain, postnasal drip, rhinorrhea, sinus pain, sinus pressure, sore throat and tinnitus  Respiratory: Negative for cough, shortness of breath and wheezing  Cardiovascular: Negative for chest pain and palpitations  Gastrointestinal: Negative for abdominal pain, anorexia, change in bowel habit, constipation, diarrhea, nausea and vomiting  Musculoskeletal: Negative for arthralgias, joint swelling, myalgias and neck pain  Skin: Negative for rash  Neurological: Positive for dizziness and vertigo  Negative for weakness, numbness and headaches  Objective:      /70 (BP Location: Left arm, Patient Position: Sitting)   Ht 5' 5" (1 651 m)   Wt 90 1 kg (198 lb 9 6 oz)   BMI 33 05 kg/m²          Physical Exam   Constitutional: He is oriented to person, place, and time  He appears well-developed and well-nourished  No distress     HENT:   Head: Normocephalic and atraumatic  Right Ear: Hearing, tympanic membrane, external ear and ear canal normal    Left Ear: Hearing, tympanic membrane, external ear and ear canal normal    Mouth/Throat: Uvula is midline, oropharynx is clear and moist and mucous membranes are normal    Eyes: Conjunctivae are normal  Right eye exhibits no discharge  Left eye exhibits no discharge  No scleral icterus  Neck: Neck supple  Carotid bruit is not present  No thyromegaly present  Cardiovascular: Normal rate, regular rhythm and normal heart sounds  No murmur heard  Pulmonary/Chest: Effort normal and breath sounds normal  No respiratory distress  He has no wheezes  Musculoskeletal: He exhibits no edema  Lymphadenopathy:     He has no cervical adenopathy  Neurological: He is alert and oriented to person, place, and time  Skin: Skin is warm and dry  No rash noted  He is not diaphoretic  No erythema  Psychiatric: He has a normal mood and affect  His behavior is normal  Judgment and thought content normal    Vitals reviewed

## 2018-05-21 ENCOUNTER — EVALUATION (OUTPATIENT)
Dept: PHYSICAL THERAPY | Facility: CLINIC | Age: 53
End: 2018-05-21
Payer: COMMERCIAL

## 2018-05-21 DIAGNOSIS — H81.11 BPPV (BENIGN PAROXYSMAL POSITIONAL VERTIGO), RIGHT: Primary | ICD-10-CM

## 2018-05-21 PROCEDURE — 95992 CANALITH REPOSITIONING PROC: CPT | Performed by: PHYSICAL THERAPIST

## 2018-05-21 PROCEDURE — G8981 BODY POS CURRENT STATUS: HCPCS | Performed by: PHYSICAL THERAPIST

## 2018-05-21 PROCEDURE — G8982 BODY POS GOAL STATUS: HCPCS | Performed by: PHYSICAL THERAPIST

## 2018-05-21 PROCEDURE — 97161 PT EVAL LOW COMPLEX 20 MIN: CPT | Performed by: PHYSICAL THERAPIST

## 2018-05-21 NOTE — PROGRESS NOTES
PT Evaluation     Today's date: 2018  Patient name: Janet Simon  : 1965  MRN: [id number]  Referring provider: Mar Garcia DO  Dx:   Encounter Diagnosis     ICD-10-CM    1  BPPV (benign paroxysmal positional vertigo), right H81 11                   Assessment  Impairments: activity intolerance    Assessment details: Vestibular Evaluation      Concurrent Complaints:  ____ Hearing Loss (Right/Left/Bilateral)  __X__ Aural Fullness (Right/Left/Bilateral)  ____ Tinnitus (Right/Left/Bilateral)  __X__ Nausea, Vomiting  ____ Altered Vision  ____ Poor Concentration  ____ Peripheral Neuropathy  ____ Headache  ____ Falls or Near Falls  ____     Memory Loss  ____ Cervical Pain    Symptom Rating Visual Analog Scales  Vertigo  Dysequilibrium  Oscillopsia        Oculomotor Function  Spontaneous Nystagmus Room Light:Negative    Gaze Holding Nystagmus Room Light:  Negative      Smooth Pursuit/VOR:  Normal      Positional Testing:  Vertebral Artery Screen:  Negative    Hallpike-Rozina Right:  Geotrophic nystagmus   Comment:  present initially, resolves following canalith repositioning treatment  Treatment included Epley maneuver x2 to address R anterior canalithiasis  HEP provided for continued home treatment          Understanding of Dx/Px/POC: good  Goals  STG: to be achieved in 4 weeks   1) Independent in basic Home Exercise Program for balance   2) Pt reporting decrease in vertiginous symptoms > 50%     LTG: to be achieved in 8 weeks   1) Pt demonstrating low fall risk on balance assessment    2) Pt reporting alleviation of vertiginous symptoms   3) Pt to be free from s/s of BPPV in bilateral canals   4) Pt free from falls    Plan  Planned therapy interventions: canalith repositioning, therapeutic activities and manual therapy  Frequency: 1x week  Duration in weeks: 4        Subjective Evaluation    History of Present Illness  Mechanism of injury: Patient reports his symptoms began as severe room-spinning dizziness upon waking on 3-27  He went to the ED where CVA and all other pathology were ruled out  He was given Meclizine and Zofran to manage symptoms  He attempted self treatment with positional movements without success     Not a recurrent problem   Pain  Current pain ratin  At best pain ratin  At worst pain ratin          Objective    Flowsheet Rows      Most Recent Value   PT/OT G-Codes   FOTO information reviewed  No   Assessment Type  Evaluation   G code set  Changing & Maintaining Body Position   Changing and Maintaining Body Position Current Status ()  CI   Changing and Maintaining Body Position Goal Status ()  CH          Precautions: n/a    Daily Treatment Diary       Exercise Diary         Epley Rx2, Lx1       Rozina Traore x1 R/L

## 2018-05-29 ENCOUNTER — OFFICE VISIT (OUTPATIENT)
Dept: PHYSICAL THERAPY | Facility: CLINIC | Age: 53
End: 2018-05-29
Payer: COMMERCIAL

## 2018-05-29 DIAGNOSIS — H81.11 BPPV (BENIGN PAROXYSMAL POSITIONAL VERTIGO), RIGHT: Primary | ICD-10-CM

## 2018-05-29 PROCEDURE — 97110 THERAPEUTIC EXERCISES: CPT | Performed by: PHYSICAL THERAPIST

## 2018-05-29 PROCEDURE — 97112 NEUROMUSCULAR REEDUCATION: CPT | Performed by: PHYSICAL THERAPIST

## 2018-05-29 PROCEDURE — 95992 CANALITH REPOSITIONING PROC: CPT | Performed by: PHYSICAL THERAPIST

## 2018-05-29 PROCEDURE — 97535 SELF CARE MNGMENT TRAINING: CPT | Performed by: PHYSICAL THERAPIST

## 2018-05-29 NOTE — PROGRESS NOTES
Today's date: 2018  Patient name: Triston Lucio  : 1965  MRN: [id number]  Referring provider: Tresa Flores DO  Dx:   Encounter Diagnosis     ICD-10-CM    1  BPPV (benign paroxysmal positional vertigo), right H81 11      Darwin will be discharged from outpatient physical therapy care due to expiration of plan of care  No objective measures updated, Darwin is not present at time of discharge  Subjective: Darwin reports that his symptoms have greatly improved but still persist        Objective: See treatment diary below      Assessment: Tolerated treatment well and tolerated additional interventions well today    Patient exhibited good technique with therapeutic exercises  Canalith positioning performed, education reviewed on neuro anatomy and pathology of BPPV  Pt ed provided on additions to HEP  Plan: Continue per plan of care        Precautions: n/a     Daily Treatment Diary         Exercise Diary           Epley Rx2, Lx1  Rx2, Lx1         Warren León x1 R/L            Wayne Jacomeoff   3x ea          VOR cx  U/D, R/L   1 min 2x ea                                                                                                                                                                                                                            Bike   L6 10'

## 2018-11-21 DIAGNOSIS — E78.5 HYPERLIPIDEMIA, UNSPECIFIED HYPERLIPIDEMIA TYPE: ICD-10-CM

## 2018-11-23 RX ORDER — SIMVASTATIN 20 MG
TABLET ORAL
Qty: 90 TABLET | Refills: 1 | Status: SHIPPED | OUTPATIENT
Start: 2018-11-23 | End: 2019-05-27 | Stop reason: SDUPTHER

## 2019-05-27 DIAGNOSIS — E78.5 HYPERLIPIDEMIA, UNSPECIFIED HYPERLIPIDEMIA TYPE: ICD-10-CM

## 2019-05-28 RX ORDER — SIMVASTATIN 20 MG
TABLET ORAL
Qty: 90 TABLET | Refills: 1 | Status: SHIPPED | OUTPATIENT
Start: 2019-05-28 | End: 2019-12-13 | Stop reason: SDUPTHER

## 2019-09-10 ENCOUNTER — OFFICE VISIT (OUTPATIENT)
Dept: FAMILY MEDICINE CLINIC | Facility: CLINIC | Age: 54
End: 2019-09-10
Payer: COMMERCIAL

## 2019-09-10 ENCOUNTER — APPOINTMENT (OUTPATIENT)
Dept: LAB | Facility: MEDICAL CENTER | Age: 54
End: 2019-09-10
Payer: COMMERCIAL

## 2019-09-10 VITALS
SYSTOLIC BLOOD PRESSURE: 114 MMHG | BODY MASS INDEX: 31.46 KG/M2 | WEIGHT: 188.8 LBS | HEIGHT: 65 IN | DIASTOLIC BLOOD PRESSURE: 82 MMHG

## 2019-09-10 DIAGNOSIS — Z12.5 SCREENING FOR PROSTATE CANCER: ICD-10-CM

## 2019-09-10 DIAGNOSIS — Z13.9 SCREENING FOR UNSPECIFIED CONDITION: ICD-10-CM

## 2019-09-10 DIAGNOSIS — Z12.11 SCREENING FOR COLON CANCER: ICD-10-CM

## 2019-09-10 DIAGNOSIS — Z00.00 ANNUAL PHYSICAL EXAM: Primary | ICD-10-CM

## 2019-09-10 DIAGNOSIS — E78.5 HYPERLIPIDEMIA, UNSPECIFIED HYPERLIPIDEMIA TYPE: ICD-10-CM

## 2019-09-10 DIAGNOSIS — Z13.29 SCREENING FOR HYPOTHYROIDISM: ICD-10-CM

## 2019-09-10 LAB
ALBUMIN SERPL BCP-MCNC: 4.8 G/DL (ref 3.5–5)
ALP SERPL-CCNC: 67 U/L (ref 46–116)
ALT SERPL W P-5'-P-CCNC: 40 U/L (ref 12–78)
ANION GAP SERPL CALCULATED.3IONS-SCNC: 7 MMOL/L (ref 4–13)
AST SERPL W P-5'-P-CCNC: 20 U/L (ref 5–45)
BILIRUB SERPL-MCNC: 0.6 MG/DL (ref 0.2–1)
BUN SERPL-MCNC: 15 MG/DL (ref 5–25)
CALCIUM SERPL-MCNC: 9 MG/DL (ref 8.3–10.1)
CHLORIDE SERPL-SCNC: 109 MMOL/L (ref 100–108)
CHOLEST SERPL-MCNC: 179 MG/DL (ref 50–200)
CO2 SERPL-SCNC: 25 MMOL/L (ref 21–32)
CREAT SERPL-MCNC: 0.93 MG/DL (ref 0.6–1.3)
ERYTHROCYTE [DISTWIDTH] IN BLOOD BY AUTOMATED COUNT: 13.2 % (ref 11.6–15.1)
GFR SERPL CREATININE-BSD FRML MDRD: 94 ML/MIN/1.73SQ M
GLUCOSE P FAST SERPL-MCNC: 87 MG/DL (ref 65–99)
HCT VFR BLD AUTO: 45.9 % (ref 36.5–49.3)
HDLC SERPL-MCNC: 76 MG/DL (ref 40–60)
HGB BLD-MCNC: 15.8 G/DL (ref 12–17)
LDLC SERPL CALC-MCNC: 96 MG/DL (ref 0–100)
MCH RBC QN AUTO: 30.9 PG (ref 26.8–34.3)
MCHC RBC AUTO-ENTMCNC: 34.4 G/DL (ref 31.4–37.4)
MCV RBC AUTO: 90 FL (ref 82–98)
NONHDLC SERPL-MCNC: 103 MG/DL
PLATELET # BLD AUTO: 175 THOUSANDS/UL (ref 149–390)
PMV BLD AUTO: 11.1 FL (ref 8.9–12.7)
POTASSIUM SERPL-SCNC: 4.3 MMOL/L (ref 3.5–5.3)
PROT SERPL-MCNC: 7.7 G/DL (ref 6.4–8.2)
PSA SERPL-MCNC: 0.8 NG/ML (ref 0–4)
RBC # BLD AUTO: 5.11 MILLION/UL (ref 3.88–5.62)
SODIUM SERPL-SCNC: 141 MMOL/L (ref 136–145)
TRIGL SERPL-MCNC: 37 MG/DL
TSH SERPL DL<=0.05 MIU/L-ACNC: 2.1 UIU/ML (ref 0.36–3.74)
WBC # BLD AUTO: 5.67 THOUSAND/UL (ref 4.31–10.16)

## 2019-09-10 PROCEDURE — 36415 COLL VENOUS BLD VENIPUNCTURE: CPT

## 2019-09-10 PROCEDURE — 80053 COMPREHEN METABOLIC PANEL: CPT

## 2019-09-10 PROCEDURE — 85027 COMPLETE CBC AUTOMATED: CPT

## 2019-09-10 PROCEDURE — 99396 PREV VISIT EST AGE 40-64: CPT | Performed by: PHYSICIAN ASSISTANT

## 2019-09-10 PROCEDURE — G0103 PSA SCREENING: HCPCS

## 2019-09-10 PROCEDURE — 80061 LIPID PANEL: CPT

## 2019-09-10 PROCEDURE — 84443 ASSAY THYROID STIM HORMONE: CPT

## 2019-09-10 NOTE — PROGRESS NOTES
ADULT ANNUAL [address]    NAME: Darwin Santos  AGE: 48 y o  SEX: male  : 1965     DATE: 9/10/2019     Assessment and Plan:     Problem List Items Addressed This Visit        Other    Hyperlipidemia    Relevant Orders    Lipid panel    Comprehensive metabolic panel    CBC and Platelet      Other Visit Diagnoses     Annual physical exam    -  Primary    Screening for colon cancer        Relevant Orders    Ambulatory referral to Gastroenterology    Screening for unspecified condition        Relevant Orders    Lipid panel    Comprehensive metabolic panel    TSH, 3rd generation    CBC and Platelet    Screening for hypothyroidism        Relevant Orders    TSH, 3rd generation    Screening for prostate cancer        Relevant Orders    PSA, Total Screen    BMI 31 0-31 9,adult              Immunizations and preventive care screenings were discussed with patient today  Appropriate education was printed on patient's after visit summary  Counseling:  Dental Health: discussed importance of regular tooth brushing, flossing, and dental visits  · Exercise: the importance of regular exercise/physical activity was discussed  Recommend exercise 3-5 times per week for at least 30 minutes  BMI Counseling: Body mass index is 31 42 kg/m²  The BMI is above normal  Nutrition recommendations include encouraging healthy choices of fruits and vegetables and moderation in carbohydrate intake  Exercise recommendations include exercising 3-5 times per week  No pharmacotherapy was ordered  Routine labs ordered  Return in 1 year (on 9/10/2020)  Chief Complaint:     Chief Complaint   Patient presents with    Well Check     Pt here today for annual well visit      History of Present Illness:     Adult Annual Physical   Patient here for a comprehensive physical exam  The patient reports no problems  He would like to get up to date with his routine labs  He is taking his simvastain daily without any problems  He is trying to watch his diet, but it has been a little lax recently  He denies any chest pains, shortness of breath, abdominal pains, bowel changes, or dysuria  He agrees to go for a screening colonoscopy  Diet and Physical Activity  · Diet/Nutrition: limited junk food  · Exercise: no formal exercise  Depression Screening  PHQ-9 Depression Screening    PHQ-9:    Frequency of the following problems over the past two weeks:       Little interest or pleasure in doing things:  0 - not at all  Feeling down, depressed, or hopeless:  0 - not at all  PHQ-2 Score:  0       General Health  · Sleep: sleeps well  · Hearing: normal - bilateral   · Vision: no vision problems, most recent eye exam >1 year ago and wears glasses  · Dental: regular dental visits   Health  · Symptoms include: none     Review of Systems:     Review of Systems   Constitutional: Negative for chills, diaphoresis, fatigue and fever  HENT: Negative for congestion, ear pain, postnasal drip, rhinorrhea, sneezing, sore throat and trouble swallowing  Eyes: Negative for pain and visual disturbance  Respiratory: Negative for apnea, cough, shortness of breath and wheezing  Cardiovascular: Negative for chest pain and palpitations  Gastrointestinal: Negative for abdominal pain, constipation, diarrhea, nausea and vomiting  Genitourinary: Negative for dysuria and hematuria  Musculoskeletal: Negative for arthralgias, gait problem and myalgias  Neurological: Negative for dizziness, syncope, weakness, light-headedness, numbness and headaches  Psychiatric/Behavioral: Negative for suicidal ideas  The patient is not nervous/anxious         Past Medical History:     Past Medical History:   Diagnosis Date    Hyperlipidemia       Past Surgical History:     Past Surgical History:   Procedure Laterality Date    NO PAST SURGERIES        Family History:     Family History Problem Relation Age of Onset    Hypertension Mother     Hyperlipidemia Family       Social History:     Social History     Socioeconomic History    Marital status: Single     Spouse name: None    Number of children: None    Years of education: None    Highest education level: None   Occupational History    None   Social Needs    Financial resource strain: None    Food insecurity:     Worry: None     Inability: None    Transportation needs:     Medical: None     Non-medical: None   Tobacco Use    Smoking status: Former Smoker    Smokeless tobacco: Never Used   Substance and Sexual Activity    Alcohol use: Yes     Frequency: Monthly or less    Drug use: No    Sexual activity: None   Lifestyle    Physical activity:     Days per week: None     Minutes per session: None    Stress: None   Relationships    Social connections:     Talks on phone: None     Gets together: None     Attends [employer] service: None     Active member of club or organization: None     Attends meetings of clubs or organizations: None     Relationship status: None    Intimate partner violence:     Fear of current or ex partner: None     Emotionally abused: None     Physically abused: None     Forced sexual activity: None   Other Topics Concern    None   Social History Narrative    No living will      Current Medications:     Current Outpatient Medications   Medication Sig Dispense Refill    simvastatin (ZOCOR) 20 mg tablet TAKE 1 TABLET BY MOUTH DAILY 90 tablet 1     No current facility-administered medications for this visit  Allergies: Allergies   Allergen Reactions    Aspirin     Penicillins       Physical Exam:     /82 (BP Location: Left arm, Patient Position: Sitting)   Ht 5' 5" (1 651 m)   Wt 85 6 kg (188 lb 12 8 oz)   BMI 31 42 kg/m²     Physical Exam   Constitutional: He is oriented to person, place, and time  He appears well-developed and well-nourished     HENT:   Head: Normocephalic and atraumatic  Right Ear: Tympanic membrane, external ear and ear canal normal    Left Ear: Tympanic membrane, external ear and ear canal normal    Nose: Nose normal    Mouth/Throat: Oropharynx is clear and moist and mucous membranes are normal  No oropharyngeal exudate, posterior oropharyngeal edema or posterior oropharyngeal erythema  Eyes: Pupils are equal, round, and reactive to light  EOM are normal    Neck: Normal range of motion  Neck supple  Cardiovascular: Normal rate, regular rhythm and normal heart sounds  Exam reveals no gallop and no friction rub  No murmur heard  Pulmonary/Chest: Effort normal and breath sounds normal  No respiratory distress  He has no wheezes  He has no rales  Abdominal: Soft  Bowel sounds are normal  There is no tenderness  There is no rebound and no guarding  Musculoskeletal: Normal range of motion  Lymphadenopathy:     He has no cervical adenopathy  Neurological: He is alert and oriented to person, place, and time  Skin: Skin is warm and dry  Psychiatric: He has a normal mood and affect  His behavior is normal  Judgment and thought content normal    Vitals reviewed        Youlanda Boast, PA-C  [facility]

## 2019-09-10 NOTE — PATIENT INSTRUCTIONS

## 2019-09-18 ENCOUNTER — PREP FOR PROCEDURE (OUTPATIENT)
Dept: GASTROENTEROLOGY | Facility: CLINIC | Age: 54
End: 2019-09-18

## 2019-09-18 ENCOUNTER — TELEPHONE (OUTPATIENT)
Dept: GASTROENTEROLOGY | Facility: HOSPITAL | Age: 54
End: 2019-09-18

## 2019-09-18 DIAGNOSIS — Z12.11 COLON CANCER SCREENING: Primary | ICD-10-CM

## 2019-09-18 NOTE — TELEPHONE ENCOUNTER
Patient and brother scheduled for colonoscopies on 11/5/19 with Dr Sidra Mendez at [facility]  Miralax/Dulcolax instructions and [employer] forms mailed to pt's home

## 2019-09-18 NOTE — TELEPHONE ENCOUNTER
09/18/19  Screened by: Viktoriya Toussaint MA    Referring Provider: Dr Wayne Hall  Pre- Screening: There is no height or weight on file to calculate BMI  Has patient been referred for a routine screening Colonoscopy? yes  Is the patient between 39-70 years old? yes    SCHEDULING STAFF   If the patient is between 45yrs-49yrs, please advise patient to confirm benefits/coverage with their insurance company for a routine screening colonoscopy, some insurance carriers will only cover at [id number] or older   If the patient is over 66years old, please schedule an office visit  Do you have any of the following symptoms? NO    Have you had a coronary or vascular stent within the last year? no    Have you had a heart attack or stroke in the last 6 months? no    Have you had intestinal surgery in the last 3 months? no    Do you have problems with: NO    Do you use: NO    Have you been hospitalized in the last Month? no    Have you been diagnosed with a bleeding disorder or anemia? no    Have you had chest pain (angina) or breathing problems  (COPD) in the last 3 months? no    Do you have any difficulty walking up a flight of stairs? no    Have you had Kidney failure or insufficiency? no    Have you had heart valve surgery? no    Are you confined to a wheelchair? no    Do you take NO    Have you been diagnosed with Diabetes or are you taking any   Diabetic medications? no    : If patient answers NO to medical questions, then schedule procedure  If patient answers YES to medical questions, then schedule office appointment  Previous Colonoscopy no  Date and Facility of last colonoscopy?  [address]    Comments: Kamini

## 2019-09-26 NOTE — TELEPHONE ENCOUNTER
Called and spoke to pt, he states that right now is not a good time for him and his brother, he wanted to know if he could reschedule for some time in February  I told him that unfortunately, our schedule is not out yet for the new year, but to give me a call some time in December, and I will be able to find them a date at that time

## 2019-12-13 DIAGNOSIS — E78.5 HYPERLIPIDEMIA, UNSPECIFIED HYPERLIPIDEMIA TYPE: ICD-10-CM

## 2019-12-13 RX ORDER — SIMVASTATIN 20 MG
TABLET ORAL
Qty: 90 TABLET | Refills: 1 | Status: SHIPPED | OUTPATIENT
Start: 2019-12-13 | End: 2020-07-02

## 2020-01-07 ENCOUNTER — TELEPHONE (OUTPATIENT)
Dept: FAMILY MEDICINE CLINIC | Facility: CLINIC | Age: 55
End: 2020-01-07

## 2020-01-10 ENCOUNTER — OFFICE VISIT (OUTPATIENT)
Dept: FAMILY MEDICINE CLINIC | Facility: CLINIC | Age: 55
End: 2020-01-10
Payer: COMMERCIAL

## 2020-01-10 VITALS
BODY MASS INDEX: 32.89 KG/M2 | OXYGEN SATURATION: 96 % | HEIGHT: 65 IN | SYSTOLIC BLOOD PRESSURE: 154 MMHG | DIASTOLIC BLOOD PRESSURE: 96 MMHG | WEIGHT: 197.4 LBS | HEART RATE: 70 BPM

## 2020-01-10 DIAGNOSIS — R23.8 SKIN SENSITIVITY: Primary | ICD-10-CM

## 2020-01-10 DIAGNOSIS — R03.0 ELEVATED BLOOD PRESSURE READING: ICD-10-CM

## 2020-01-10 PROCEDURE — 3008F BODY MASS INDEX DOCD: CPT | Performed by: PHYSICIAN ASSISTANT

## 2020-01-10 PROCEDURE — 1036F TOBACCO NON-USER: CPT | Performed by: PHYSICIAN ASSISTANT

## 2020-01-10 PROCEDURE — 99214 OFFICE O/P EST MOD 30 MIN: CPT | Performed by: PHYSICIAN ASSISTANT

## 2020-01-10 RX ORDER — PREDNISONE 10 MG/1
TABLET ORAL
Qty: 20 TABLET | Refills: 0 | Status: SHIPPED | OUTPATIENT
Start: 2020-01-10 | End: 2020-11-30

## 2020-01-10 NOTE — PROGRESS NOTES
Assessment/Plan:    Problem List Items Addressed This Visit     None      Visit Diagnoses     Skin sensitivity    -  Primary    Relevant Medications    predniSONE 10 mg tablet    Elevated blood pressure reading               Diagnoses and all orders for this visit:    Skin sensitivity  -     predniSONE 10 mg tablet; Days 1 and 2 take 4 tablets daily, days 3 and 4 take 3 tablets daily, days 5 and 6 and 2 tablets daily, days 7 and 8 take 1 tablet daily  Elevated blood pressure reading        Symptoms sound almost like HZV, however, there is not a rash present  Symptoms present x 3 weeks  Will try prednisone taper  I've asked Darwin to return in 2 weeks for recheck  Also, he will stop eating chocolate in order to lower is caffeine intake  Will recheck BP at that time and will initiate BP medication if needed  He will call GI to schedule his colonoscopy at his earliest convenience  Subjective:      Patient ID: Olivia Shahid is a 48 y o  male  Darwin is here today complaining of sensitive/painful skin along the L side of his neck and scalp x 3 weeks  The pain has not gotten better or worse in 3 weeks  There is not a rash present  He feels it occurred after turning his head and feeling a "small pinch "   Also of note, his BP is elevated today  He states he has been elevated at home as well  He admits to eating " a lot" of chocolate recently  He has paperwork and contact information at home to call GI and schedule his colonoscopy for colon CA screening  He will do this now that the holiday season is over  The following portions of the patient's history were reviewed and updated as appropriate:   He has a past medical history of Hyperlipidemia ,  does not have any pertinent problems on file  ,   has a past surgical history that includes No past surgeries  ,  family history includes Hyperlipidemia in his family; Hypertension in his mother  ,   reports that he has quit smoking   He has never used smokeless tobacco  He reports that he drinks alcohol  He reports that he does not use drugs  ,  is allergic to aspirin and penicillins     Current Outpatient Medications   Medication Sig Dispense Refill    simvastatin (ZOCOR) 20 mg tablet TAKE 1 TABLET BY MOUTH DAILY 90 tablet 1    predniSONE 10 mg tablet Days 1 and 2 take 4 tablets daily, days 3 and 4 take 3 tablets daily, days 5 and 6 and 2 tablets daily, days 7 and 8 take 1 tablet daily  20 tablet 0     No current facility-administered medications for this visit  Review of Systems   Constitutional: Negative for activity change, appetite change, chills, diaphoresis, fatigue, fever and unexpected weight change  HENT: Negative for congestion, ear pain, postnasal drip, rhinorrhea, sinus pressure, sinus pain, sneezing, sore throat, tinnitus and voice change  Eyes: Negative for pain, redness and visual disturbance  Respiratory: Negative for cough, chest tightness, shortness of breath and wheezing  Cardiovascular: Negative for chest pain, palpitations and leg swelling  Gastrointestinal: Negative for abdominal pain, blood in stool, constipation, diarrhea, nausea and vomiting  Genitourinary: Negative for difficulty urinating, dysuria, frequency, hematuria and urgency  Musculoskeletal: Negative for arthralgias, back pain, gait problem, joint swelling, myalgias, neck pain and neck stiffness  Skin: Negative for color change, pallor, rash and wound  Skin sensitivity along L side of neck   Neurological: Negative for dizziness, tremors, weakness, light-headedness and headaches  Psychiatric/Behavioral: Negative for dysphoric mood, self-injury, sleep disturbance and suicidal ideas  The patient is not nervous/anxious            Objective:  Vitals:    01/10/20 1007   BP: 154/96   BP Location: Left arm   Patient Position: Sitting   Pulse: 70   SpO2: 96%   Weight: 89 5 kg (197 lb 6 4 oz)   Height: 5' 5" (1 651 m)     Body mass index is 32 85 kg/m²  Physical Exam   Constitutional: He is oriented to person, place, and time  He appears well-developed and well-nourished  No distress  HENT:   Head: Normocephalic and atraumatic  Right Ear: Hearing, tympanic membrane, external ear and ear canal normal    Left Ear: Hearing, tympanic membrane, external ear and ear canal normal    Mouth/Throat: Uvula is midline, oropharynx is clear and moist and mucous membranes are normal  No oropharyngeal exudate  Eyes: Conjunctivae are normal  Right eye exhibits no discharge  Left eye exhibits no discharge  No scleral icterus  Neck: Neck supple  Carotid bruit is not present  No thyromegaly present  Cardiovascular: Normal rate, regular rhythm and normal heart sounds  No murmur heard  Pulmonary/Chest: Effort normal and breath sounds normal  No respiratory distress  He has no wheezes  Abdominal: Soft  Bowel sounds are normal  He exhibits no distension and no mass  There is no tenderness  There is no rebound and no guarding  Musculoskeletal: Normal range of motion  He exhibits no edema or tenderness  Lymphadenopathy:     He has no cervical adenopathy  Neurological: He is alert and oriented to person, place, and time  Skin: Skin is warm and dry  No rash noted  He is not diaphoretic  No erythema  Psychiatric: He has a normal mood and affect  His behavior is normal  Judgment and thought content normal    Vitals reviewed

## 2020-03-27 ENCOUNTER — TELEMEDICINE (OUTPATIENT)
Dept: FAMILY MEDICINE CLINIC | Facility: CLINIC | Age: 55
End: 2020-03-27
Payer: COMMERCIAL

## 2020-03-27 ENCOUNTER — TELEPHONE (OUTPATIENT)
Dept: FAMILY MEDICINE CLINIC | Facility: CLINIC | Age: 55
End: 2020-03-27

## 2020-03-27 VITALS
DIASTOLIC BLOOD PRESSURE: 80 MMHG | SYSTOLIC BLOOD PRESSURE: 140 MMHG | HEIGHT: 65 IN | BODY MASS INDEX: 32.65 KG/M2 | TEMPERATURE: 98 F | WEIGHT: 196 LBS

## 2020-03-27 DIAGNOSIS — J06.9 URI, ACUTE: Primary | ICD-10-CM

## 2020-03-27 PROCEDURE — G2012 BRIEF CHECK IN BY MD/QHP: HCPCS | Performed by: PHYSICIAN ASSISTANT

## 2020-03-27 PROCEDURE — 3008F BODY MASS INDEX DOCD: CPT | Performed by: PHYSICIAN ASSISTANT

## 2020-03-27 RX ORDER — AZITHROMYCIN 250 MG/1
TABLET, FILM COATED ORAL
Qty: 6 TABLET | Refills: 0 | Status: SHIPPED | OUTPATIENT
Start: 2020-03-27 | End: 2020-03-31

## 2020-03-27 NOTE — TELEPHONE ENCOUNTER
Runny nose - Scratchy throat & dry cough (Tickle in his tonsils) x 1 wk - NO fever or SOB - NO travel & in home x 8 days - No relief from Mucines    Allergies: Aspirin & Penicillin    Pharm: [facility]

## 2020-03-28 NOTE — PROGRESS NOTES
Virtual Brief Visit    Problem List Items Addressed This Visit     None      Visit Diagnoses     URI, acute    -  Primary    Relevant Medications    azithromycin (Zithromax) 250 mg tablet                Reason for visit is acute cold symptoms  Encounter provider Lakshmi Bland PA-C    Provider located at [address] [address]      Recent Visits  No visits were found meeting these conditions  Showing recent visits within past 7 days and meeting all other requirements     Today's Visits  Date Type Provider Dept   03/27/20 Telemedicine [facility]   03/27/20 Telephone Ivelisse Berry   Showing today's visits and meeting all other requirements     Future Appointments  Date Type Provider Dept   03/27/20 Telemedicine [facility]   Showing future appointments within next 150 days and meeting all other requirements        After connecting through telephone, the patient was identified by name and date of birth  Gal Leigh was informed that this is a telemedicine visit and that the visit is being conducted through telephone  My office door was closed  No one else was in the room  He acknowledged consent and understanding of privacy and security of the video platform  The patient has agreed to participate and understands they can discontinue the visit at any time  Patient is aware this is a billable service  Subjective  Darwin Haas is a 50 y o  male  States that his cold symptoms started 4-5 days ago and describes it as "itchy throat, tickle tonsil, burny tongue " He's been using Mucinex with some relief  Denies fever/chills, denies SOB, denies N/V/D         Past Medical History:   Diagnosis Date    Hyperlipidemia        Past Surgical History:   Procedure Laterality Date    NO PAST SURGERIES         Current Outpatient Medications   Medication Sig Dispense Refill    simvastatin (ZOCOR) 20 mg tablet TAKE 1 TABLET BY MOUTH DAILY 90 tablet 1    azithromycin (Zithromax) 250 mg tablet Day 1 take 2 tablets, days 2-5 take 1 tablet  6 tablet 0    predniSONE 10 mg tablet Days 1 and 2 take 4 tablets daily, days 3 and 4 take 3 tablets daily, days 5 and 6 and 2 tablets daily, days 7 and 8 take 1 tablet daily  (Patient not taking: Reported on 3/27/2020) 20 tablet 0     No current facility-administered medications for this visit  Allergies   Allergen Reactions    Aspirin     Penicillins        Review of Systems   Constitutional: Negative for activity change, appetite change, chills, diaphoresis, fatigue, fever and unexpected weight change  HENT: Positive for sore throat  Negative for congestion, ear pain, postnasal drip, rhinorrhea, sinus pressure, sinus pain, sneezing, tinnitus and voice change  Eyes: Negative for pain, redness and visual disturbance  Respiratory: Positive for cough (very mild)  Negative for chest tightness, shortness of breath and wheezing  Cardiovascular: Negative for chest pain, palpitations and leg swelling  Gastrointestinal: Negative for abdominal pain, blood in stool, constipation, diarrhea, nausea and vomiting  Genitourinary: Negative for difficulty urinating, dysuria, frequency, hematuria and urgency  Musculoskeletal: Negative for arthralgias, back pain, gait problem, joint swelling, myalgias, neck pain and neck stiffness  Skin: Negative for color change, pallor, rash and wound  Neurological: Negative for dizziness, tremors, weakness, light-headedness and headaches  Psychiatric/Behavioral: Negative for dysphoric mood, self-injury, sleep disturbance and suicidal ideas  The patient is not nervous/anxious  I spent 5 minutes with the patient during this visit

## 2020-07-02 DIAGNOSIS — E78.5 HYPERLIPIDEMIA, UNSPECIFIED HYPERLIPIDEMIA TYPE: ICD-10-CM

## 2020-07-02 RX ORDER — SIMVASTATIN 20 MG
TABLET ORAL
Qty: 90 TABLET | Refills: 1 | Status: SHIPPED | OUTPATIENT
Start: 2020-07-02 | End: 2021-01-05

## 2020-09-29 ENCOUNTER — APPOINTMENT (RX ONLY)
Dept: URBAN - NONMETROPOLITAN AREA CLINIC 4 | Facility: CLINIC | Age: 55
Setting detail: DERMATOLOGY
End: 2020-09-29

## 2020-09-29 DIAGNOSIS — L82.0 INFLAMED SEBORRHEIC KERATOSIS: ICD-10-CM

## 2020-09-29 DIAGNOSIS — L72.8 OTHER FOLLICULAR CYSTS OF THE SKIN AND SUBCUTANEOUS TISSUE: ICD-10-CM

## 2020-09-29 PROCEDURE — ? INTRALESIONAL KENALOG

## 2020-09-29 PROCEDURE — 11900 INJECT SKIN LESIONS </W 7: CPT | Mod: 59

## 2020-09-29 PROCEDURE — ? COUNSELING

## 2020-09-29 PROCEDURE — ? LIQUID NITROGEN

## 2020-09-29 PROCEDURE — 17110 DESTRUCTION B9 LES UP TO 14: CPT

## 2020-09-29 ASSESSMENT — LOCATION DETAILED DESCRIPTION DERM
LOCATION DETAILED: RIGHT CLAVICULAR NECK
LOCATION DETAILED: RIGHT MID-UPPER BACK

## 2020-09-29 ASSESSMENT — LOCATION SIMPLE DESCRIPTION DERM
LOCATION SIMPLE: RIGHT ANTERIOR NECK
LOCATION SIMPLE: RIGHT UPPER BACK

## 2020-09-29 ASSESSMENT — LOCATION ZONE DERM
LOCATION ZONE: NECK
LOCATION ZONE: TRUNK

## 2020-09-29 NOTE — HPI: SKIN LESION
What Type Of Note Output Would You Prefer (Optional)?: Standard Output
How Severe Is Your Skin Lesion?: mild
Has Your Skin Lesion Been Treated?: not been treated
Is This A New Presentation, Or A Follow-Up?: Skin Lesion
Additional History: Patient had a pain in his back for about a month, the chiropractor noticed a spot there yesterday and suggested he have it checked to see if it is shingles.

## 2020-09-29 NOTE — PROCEDURE: INTRALESIONAL KENALOG
X Size Of Lesion In Cm (Optional): 0
Treatment Number (Optional): 1
Total Volume Injected (Ccs- Only Use Numbers And Decimals): .2
Detail Level: Zone
Consent: The risks of atrophy were reviewed with the patient.
Concentration Of Solution Injected (Mg/Ml): 3.0
Medical Necessity Clause: This procedure was medically necessary because the lesions that were treated were:
Include Z78.9 (Other Specified Conditions Influencing Health Status) As An Associated Diagnosis?: No
Kenalog Preparation: Kenalog
Administered By (Optional): Corey Smith PA-C

## 2020-09-29 NOTE — PROCEDURE: LIQUID NITROGEN
Medical Necessity Information: It is in your best interest to select a reason for this procedure from the list below. All of these items fulfill various CMS LCD requirements except the new and changing color options.
Add 52 Modifier (Optional): no
Detail Level: Zone
Consent: The patient's consent was obtained including but not limited to risks of crusting, scabbing, blistering, scarring, darker or lighter pigmentary change, recurrence, incomplete removal and infection.
Post-Care Instructions: I reviewed with the patient in detail post-care instructions. Patient is to wear sunprotection, and avoid picking at any of the treated lesions. Pt may apply Vaseline to crusted or scabbing areas.
Number Of Freeze-Thaw Cycles: 1 freeze-thaw cycle
Render Post-Care Instructions In Note?: yes
Medical Necessity Clause: This procedure was medically necessary because the lesions that were treated were:

## 2020-11-30 ENCOUNTER — OFFICE VISIT (OUTPATIENT)
Dept: FAMILY MEDICINE CLINIC | Facility: CLINIC | Age: 55
End: 2020-11-30
Payer: COMMERCIAL

## 2020-11-30 VITALS
HEART RATE: 68 BPM | SYSTOLIC BLOOD PRESSURE: 122 MMHG | HEIGHT: 65 IN | TEMPERATURE: 97.8 F | DIASTOLIC BLOOD PRESSURE: 70 MMHG | OXYGEN SATURATION: 97 % | BODY MASS INDEX: 31.36 KG/M2 | WEIGHT: 188.2 LBS

## 2020-11-30 DIAGNOSIS — Z00.00 WELL ADULT EXAM: Primary | ICD-10-CM

## 2020-11-30 DIAGNOSIS — M54.6 LEFT-SIDED THORACIC BACK PAIN, UNSPECIFIED CHRONICITY: ICD-10-CM

## 2020-11-30 DIAGNOSIS — Z00.00 ANNUAL PHYSICAL EXAM: ICD-10-CM

## 2020-11-30 PROCEDURE — 99396 PREV VISIT EST AGE 40-64: CPT | Performed by: PHYSICIAN ASSISTANT

## 2020-12-01 ENCOUNTER — LAB (OUTPATIENT)
Dept: LAB | Facility: MEDICAL CENTER | Age: 55
End: 2020-12-01
Payer: COMMERCIAL

## 2020-12-01 DIAGNOSIS — Z00.00 WELL ADULT EXAM: ICD-10-CM

## 2020-12-01 DIAGNOSIS — Z13.9 SCREENING FOR CONDITION: ICD-10-CM

## 2020-12-01 DIAGNOSIS — Z13.220 SCREENING FOR HYPERLIPIDEMIA: ICD-10-CM

## 2020-12-01 DIAGNOSIS — Z12.5 SCREENING FOR PROSTATE CANCER: ICD-10-CM

## 2020-12-01 DIAGNOSIS — Z13.220 SCREENING FOR HYPERLIPIDEMIA: Primary | ICD-10-CM

## 2020-12-01 LAB
ALBUMIN SERPL BCP-MCNC: 4 G/DL (ref 3.5–5)
ALP SERPL-CCNC: 64 U/L (ref 46–116)
ALT SERPL W P-5'-P-CCNC: 31 U/L (ref 12–78)
ANION GAP SERPL CALCULATED.3IONS-SCNC: 2 MMOL/L (ref 4–13)
AST SERPL W P-5'-P-CCNC: 9 U/L (ref 5–45)
BILIRUB SERPL-MCNC: 0.67 MG/DL (ref 0.2–1)
BUN SERPL-MCNC: 15 MG/DL (ref 5–25)
CALCIUM SERPL-MCNC: 9.1 MG/DL (ref 8.3–10.1)
CHLORIDE SERPL-SCNC: 110 MMOL/L (ref 100–108)
CHOLEST SERPL-MCNC: 157 MG/DL (ref 50–200)
CO2 SERPL-SCNC: 27 MMOL/L (ref 21–32)
CREAT SERPL-MCNC: 0.94 MG/DL (ref 0.6–1.3)
ERYTHROCYTE [DISTWIDTH] IN BLOOD BY AUTOMATED COUNT: 13.9 % (ref 11.6–15.1)
GFR SERPL CREATININE-BSD FRML MDRD: 92 ML/MIN/1.73SQ M
GLUCOSE P FAST SERPL-MCNC: 82 MG/DL (ref 65–99)
HCT VFR BLD AUTO: 45.9 % (ref 36.5–49.3)
HDLC SERPL-MCNC: 76 MG/DL
HGB BLD-MCNC: 16 G/DL (ref 12–17)
LDLC SERPL CALC-MCNC: 71 MG/DL (ref 0–100)
MCH RBC QN AUTO: 30.9 PG (ref 26.8–34.3)
MCHC RBC AUTO-ENTMCNC: 34.9 G/DL (ref 31.4–37.4)
MCV RBC AUTO: 89 FL (ref 82–98)
PLATELET # BLD AUTO: 187 THOUSANDS/UL (ref 149–390)
PMV BLD AUTO: 10.8 FL (ref 8.9–12.7)
POTASSIUM SERPL-SCNC: 4.2 MMOL/L (ref 3.5–5.3)
PROT SERPL-MCNC: 7.4 G/DL (ref 6.4–8.2)
PSA SERPL-MCNC: 0.5 NG/ML (ref 0–4)
RBC # BLD AUTO: 5.18 MILLION/UL (ref 3.88–5.62)
SODIUM SERPL-SCNC: 139 MMOL/L (ref 136–145)
TRIGL SERPL-MCNC: 50 MG/DL
WBC # BLD AUTO: 5.59 THOUSAND/UL (ref 4.31–10.16)

## 2020-12-01 PROCEDURE — G0103 PSA SCREENING: HCPCS

## 2020-12-01 PROCEDURE — 80053 COMPREHEN METABOLIC PANEL: CPT

## 2020-12-01 PROCEDURE — 86769 SARS-COV-2 COVID-19 ANTIBODY: CPT

## 2020-12-01 PROCEDURE — 85027 COMPLETE CBC AUTOMATED: CPT

## 2020-12-01 PROCEDURE — 36415 COLL VENOUS BLD VENIPUNCTURE: CPT

## 2020-12-01 PROCEDURE — 80061 LIPID PANEL: CPT

## 2020-12-02 LAB — SARS-COV-2 IGG+IGM SERPL QL IA: NORMAL

## 2021-01-05 DIAGNOSIS — E78.5 HYPERLIPIDEMIA, UNSPECIFIED HYPERLIPIDEMIA TYPE: ICD-10-CM

## 2021-01-05 RX ORDER — SIMVASTATIN 20 MG
TABLET ORAL
Qty: 90 TABLET | Refills: 1 | Status: SHIPPED | OUTPATIENT
Start: 2021-01-05 | End: 2021-07-08

## 2021-03-30 DIAGNOSIS — Z23 ENCOUNTER FOR IMMUNIZATION: ICD-10-CM

## 2021-04-05 ENCOUNTER — TELEPHONE (OUTPATIENT)
Dept: FAMILY MEDICINE CLINIC | Facility: CLINIC | Age: 56
End: 2021-04-05

## 2021-04-05 NOTE — TELEPHONE ENCOUNTER
Allergies to Aspirin & Penicillin -   Question - will it be okay for him to get his COVID Vaccination this Wed having these allergies?     He is scheduled at [facility]  4/7/21 -

## 2021-04-07 ENCOUNTER — IMMUNIZATIONS (OUTPATIENT)
Dept: FAMILY MEDICINE CLINIC | Facility: HOSPITAL | Age: 56
End: 2021-04-07

## 2021-04-07 DIAGNOSIS — Z23 ENCOUNTER FOR IMMUNIZATION: Primary | ICD-10-CM

## 2021-04-07 PROCEDURE — 91301 SARS-COV-2 / COVID-19 MRNA VACCINE (MODERNA) 100 MCG: CPT

## 2021-04-07 PROCEDURE — 0011A SARS-COV-2 / COVID-19 MRNA VACCINE (MODERNA) 100 MCG: CPT

## 2021-05-11 ENCOUNTER — IMMUNIZATIONS (OUTPATIENT)
Dept: FAMILY MEDICINE CLINIC | Facility: HOSPITAL | Age: 56
End: 2021-05-11

## 2021-05-11 DIAGNOSIS — Z23 ENCOUNTER FOR IMMUNIZATION: Primary | ICD-10-CM

## 2021-05-11 PROCEDURE — 91301 SARS-COV-2 / COVID-19 MRNA VACCINE (MODERNA) 100 MCG: CPT

## 2021-05-11 PROCEDURE — 0012A SARS-COV-2 / COVID-19 MRNA VACCINE (MODERNA) 100 MCG: CPT

## 2021-06-22 ENCOUNTER — VBI (OUTPATIENT)
Dept: ADMINISTRATIVE | Facility: OTHER | Age: 56
End: 2021-06-22

## 2021-07-08 DIAGNOSIS — E78.5 HYPERLIPIDEMIA, UNSPECIFIED HYPERLIPIDEMIA TYPE: ICD-10-CM

## 2021-07-08 RX ORDER — SIMVASTATIN 20 MG
TABLET ORAL
Qty: 90 TABLET | Refills: 1 | Status: SHIPPED | OUTPATIENT
Start: 2021-07-08 | End: 2022-03-11 | Stop reason: SDUPTHER

## 2021-07-20 ENCOUNTER — VBI (OUTPATIENT)
Dept: ADMINISTRATIVE | Facility: OTHER | Age: 56
End: 2021-07-20

## 2021-12-03 ENCOUNTER — VBI (OUTPATIENT)
Dept: ADMINISTRATIVE | Facility: OTHER | Age: 56
End: 2021-12-03

## 2022-02-15 ENCOUNTER — TELEPHONE (OUTPATIENT)
Dept: FAMILY MEDICINE CLINIC | Facility: CLINIC | Age: 57
End: 2022-02-15

## 2022-03-09 DIAGNOSIS — E78.5 HYPERLIPIDEMIA, UNSPECIFIED HYPERLIPIDEMIA TYPE: ICD-10-CM

## 2022-03-09 RX ORDER — SIMVASTATIN 20 MG
20 TABLET ORAL DAILY
Qty: 90 TABLET | Refills: 1 | OUTPATIENT
Start: 2022-03-09

## 2022-03-11 DIAGNOSIS — E78.5 HYPERLIPIDEMIA, UNSPECIFIED HYPERLIPIDEMIA TYPE: ICD-10-CM

## 2022-03-14 RX ORDER — SIMVASTATIN 20 MG
20 TABLET ORAL DAILY
Qty: 90 TABLET | Refills: 0 | Status: SHIPPED | OUTPATIENT
Start: 2022-03-14 | End: 2022-03-15 | Stop reason: SDUPTHER

## 2022-03-15 ENCOUNTER — APPOINTMENT (OUTPATIENT)
Dept: LAB | Facility: MEDICAL CENTER | Age: 57
End: 2022-03-15
Payer: COMMERCIAL

## 2022-03-15 ENCOUNTER — OFFICE VISIT (OUTPATIENT)
Dept: FAMILY MEDICINE CLINIC | Facility: CLINIC | Age: 57
End: 2022-03-15
Payer: COMMERCIAL

## 2022-03-15 VITALS
WEIGHT: 200.6 LBS | HEART RATE: 70 BPM | OXYGEN SATURATION: 95 % | DIASTOLIC BLOOD PRESSURE: 70 MMHG | TEMPERATURE: 97 F | BODY MASS INDEX: 33.42 KG/M2 | HEIGHT: 65 IN | SYSTOLIC BLOOD PRESSURE: 116 MMHG

## 2022-03-15 DIAGNOSIS — Z00.00 ANNUAL PHYSICAL EXAM: ICD-10-CM

## 2022-03-15 DIAGNOSIS — Z00.00 WELL ADULT EXAM: Primary | ICD-10-CM

## 2022-03-15 DIAGNOSIS — E78.5 HYPERLIPIDEMIA, UNSPECIFIED HYPERLIPIDEMIA TYPE: ICD-10-CM

## 2022-03-15 DIAGNOSIS — Z13.89 SCREENING FOR MULTIPLE CONDITIONS: ICD-10-CM

## 2022-03-15 DIAGNOSIS — Z12.11 SCREENING FOR COLON CANCER: ICD-10-CM

## 2022-03-15 LAB
ALBUMIN SERPL BCP-MCNC: 4 G/DL (ref 3.5–5)
ALP SERPL-CCNC: 66 U/L (ref 46–116)
ALT SERPL W P-5'-P-CCNC: 51 U/L (ref 12–78)
ANION GAP SERPL CALCULATED.3IONS-SCNC: 5 MMOL/L (ref 4–13)
AST SERPL W P-5'-P-CCNC: 24 U/L (ref 5–45)
BILIRUB SERPL-MCNC: 0.44 MG/DL (ref 0.2–1)
BUN SERPL-MCNC: 15 MG/DL (ref 5–25)
CALCIUM SERPL-MCNC: 8.7 MG/DL (ref 8.3–10.1)
CHLORIDE SERPL-SCNC: 111 MMOL/L (ref 100–108)
CHOLEST SERPL-MCNC: 218 MG/DL
CO2 SERPL-SCNC: 23 MMOL/L (ref 21–32)
CREAT SERPL-MCNC: 1 MG/DL (ref 0.6–1.3)
ERYTHROCYTE [DISTWIDTH] IN BLOOD BY AUTOMATED COUNT: 13.7 % (ref 11.6–15.1)
GFR SERPL CREATININE-BSD FRML MDRD: 84 ML/MIN/1.73SQ M
GLUCOSE P FAST SERPL-MCNC: 95 MG/DL (ref 65–99)
HCT VFR BLD AUTO: 42.3 % (ref 36.5–49.3)
HDLC SERPL-MCNC: 68 MG/DL
HGB BLD-MCNC: 15.2 G/DL (ref 12–17)
LDLC SERPL CALC-MCNC: 128 MG/DL (ref 0–100)
MCH RBC QN AUTO: 30.8 PG (ref 26.8–34.3)
MCHC RBC AUTO-ENTMCNC: 35.9 G/DL (ref 31.4–37.4)
MCV RBC AUTO: 86 FL (ref 82–98)
PLATELET # BLD AUTO: 188 THOUSANDS/UL (ref 149–390)
PMV BLD AUTO: 11.3 FL (ref 8.9–12.7)
POTASSIUM SERPL-SCNC: 4.3 MMOL/L (ref 3.5–5.3)
PROT SERPL-MCNC: 7.3 G/DL (ref 6.4–8.2)
PSA SERPL-MCNC: 0.8 NG/ML (ref 0–4)
RBC # BLD AUTO: 4.93 MILLION/UL (ref 3.88–5.62)
SODIUM SERPL-SCNC: 139 MMOL/L (ref 136–145)
TRIGL SERPL-MCNC: 112 MG/DL
WBC # BLD AUTO: 5.56 THOUSAND/UL (ref 4.31–10.16)

## 2022-03-15 PROCEDURE — 3725F SCREEN DEPRESSION PERFORMED: CPT | Performed by: PHYSICIAN ASSISTANT

## 2022-03-15 PROCEDURE — 85027 COMPLETE CBC AUTOMATED: CPT

## 2022-03-15 PROCEDURE — 36415 COLL VENOUS BLD VENIPUNCTURE: CPT

## 2022-03-15 PROCEDURE — 80053 COMPREHEN METABOLIC PANEL: CPT

## 2022-03-15 PROCEDURE — 99396 PREV VISIT EST AGE 40-64: CPT | Performed by: PHYSICIAN ASSISTANT

## 2022-03-15 PROCEDURE — 80061 LIPID PANEL: CPT

## 2022-03-15 PROCEDURE — 3008F BODY MASS INDEX DOCD: CPT | Performed by: PHYSICIAN ASSISTANT

## 2022-03-15 PROCEDURE — 1036F TOBACCO NON-USER: CPT | Performed by: PHYSICIAN ASSISTANT

## 2022-03-15 PROCEDURE — G0103 PSA SCREENING: HCPCS

## 2022-03-15 RX ORDER — SIMVASTATIN 20 MG
20 TABLET ORAL DAILY
Qty: 90 TABLET | Refills: 1 | Status: SHIPPED | OUTPATIENT
Start: 2022-03-15

## 2022-03-15 RX ORDER — SIMVASTATIN 20 MG
20 TABLET ORAL DAILY
Qty: 90 TABLET | Refills: 1 | Status: SHIPPED | OUTPATIENT
Start: 2022-03-15 | End: 2022-03-15 | Stop reason: SDUPTHER

## 2022-03-15 NOTE — PATIENT INSTRUCTIONS

## 2022-03-15 NOTE — PROGRESS NOTES
ADULT ANNUAL [address] PRIMARY CARE    NAME: Darwin Arellano  AGE: 56 y o  SEX: male  : 1965     DATE: 3/15/2022     Assessment and Plan:     Problem List Items Addressed This Visit        Other    Hyperlipidemia    Relevant Medications    simvastatin (ZOCOR) 20 mg tablet      Other Visit Diagnoses     Well adult exam    -  Primary    Screening for colon cancer        Relevant Orders    Cologuard    Screening for multiple conditions        Relevant Orders    CBC    Comprehensive metabolic panel    Lipid Panel with Direct LDL reflex    PSA, Total Screen    Annual physical exam              Immunizations and preventive care screenings were discussed with patient today  Appropriate education was printed on patient's after visit summary  Counseling:  · Dental Health: discussed importance of regular tooth brushing, flossing, and dental visits  BMI Counseling: Body mass index is 33 38 kg/m²  The BMI is above normal  Nutrition recommendations include decreasing portion sizes, encouraging healthy choices of fruits and vegetables, decreasing fast food intake, consuming healthier snacks, limiting drinks that contain sugar, moderation in carbohydrate intake, increasing intake of lean protein, reducing intake of saturated and trans fat and reducing intake of cholesterol  Exercise recommendations include moderate physical activity 150 minutes/week  Rationale for BMI follow-up plan is due to patient being overweight or obese  Depression Screening and Follow-up Plan: Patient was screened for depression during today's encounter  They screened negative with a PHQ-2 score of 0  Return in about 1 year (around 3/15/2023)  Chief Complaint:     Chief Complaint   Patient presents with    Annual Exam      History of Present Illness:     Adult Annual Physical   Patient here for a comprehensive physical exam  The patient reports no problems      Diet and Physical Activity  · Diet/Nutrition: well balanced diet  · Exercise: moderate cardiovascular exercise  Depression Screening  PHQ-2/9 Depression Screening    Little interest or pleasure in doing things: 0 - not at all  Feeling down, depressed, or hopeless: 0 - not at all  PHQ-2 Score: 0  PHQ-2 Interpretation: Negative depression screen       General Health  · Sleep: sleeps well  · Hearing: normal - bilateral   · Vision: goes for regular eye exams  · Dental: regular dental visits  Review of Systems:     Review of Systems   Constitutional: Negative for activity change, appetite change, chills, diaphoresis, fatigue, fever and unexpected weight change  HENT: Negative for congestion, ear pain, postnasal drip, rhinorrhea, sinus pressure, sinus pain, sneezing, sore throat, tinnitus and voice change  Eyes: Negative for pain, redness and visual disturbance  Respiratory: Negative for cough, chest tightness, shortness of breath and wheezing  Cardiovascular: Negative for chest pain, palpitations and leg swelling  Gastrointestinal: Negative for abdominal pain, blood in stool, constipation, diarrhea, nausea and vomiting  Genitourinary: Negative for difficulty urinating, dysuria, frequency, hematuria and urgency  Musculoskeletal: Negative for arthralgias, back pain, gait problem, joint swelling, myalgias, neck pain and neck stiffness  Skin: Negative for color change, pallor, rash and wound  Neurological: Negative for dizziness, tremors, weakness, light-headedness and headaches  Psychiatric/Behavioral: Negative for dysphoric mood, self-injury, sleep disturbance and suicidal ideas  The patient is not nervous/anxious         Past Medical History:     Past Medical History:   Diagnosis Date    Hyperlipidemia       Past Surgical History:     Past Surgical History:   Procedure Laterality Date    NO PAST SURGERIES        Family History:     Family History   Problem Relation Age of Onset    Hypertension Mother     Hyperlipidemia Family       Social History:     Social History     Socioeconomic History    Marital status: Single     Spouse name: None    Number of children: None    Years of education: None    Highest education level: None   Occupational History    None   Tobacco Use    Smoking status: Former Smoker    Smokeless tobacco: Never Used   Substance and Sexual Activity    Alcohol use: Yes    Drug use: No    Sexual activity: None   Other Topics Concern    None   Social History Narrative    No living will     Social Determinants of Health     Financial Resource Strain: Not on file   Food Insecurity: Not on file   Transportation Needs: Not on file   Physical Activity: Not on file   Stress: Not on file   Social Connections: Not on file   Intimate Partner Violence: Not on file   Housing Stability: Not on file      Current Medications:     Current Outpatient Medications   Medication Sig Dispense Refill    simvastatin (ZOCOR) 20 mg tablet Take 1 tablet (20 mg total) by mouth daily 90 tablet 1     No current facility-administered medications for this visit  Allergies: Allergies   Allergen Reactions    Aspirin     Penicillins       Physical Exam:     /70   Pulse 70   Temp (!) 97 °F (36 1 °C)   Ht 5' 5" (1 651 m)   Wt 91 kg (200 lb 9 6 oz)   SpO2 95%   BMI 33 38 kg/m²     Physical Exam  Vitals and nursing note reviewed  Constitutional:       General: He is not in acute distress  Appearance: He is well-developed  He is not diaphoretic  HENT:      Head: Normocephalic and atraumatic  Right Ear: External ear normal       Left Ear: External ear normal       Nose: Nose normal       Mouth/Throat:      Pharynx: No oropharyngeal exudate  Eyes:      General: No scleral icterus  Right eye: No discharge  Left eye: No discharge  Conjunctiva/sclera: Conjunctivae normal    Neck:      Thyroid: No thyromegaly  Vascular: No carotid bruit or JVD        Trachea: No tracheal deviation  Cardiovascular:      Rate and Rhythm: Normal rate and regular rhythm  Heart sounds: Normal heart sounds  No murmur heard  Pulmonary:      Effort: Pulmonary effort is normal  No respiratory distress  Breath sounds: Normal breath sounds  No wheezing  Abdominal:      General: Bowel sounds are normal  There is no distension  Palpations: Abdomen is soft  Tenderness: There is no abdominal tenderness  Musculoskeletal:         General: No tenderness or deformity  Normal range of motion  Cervical back: Normal range of motion and neck supple  Lymphadenopathy:      Cervical: No cervical adenopathy  Skin:     General: Skin is warm and dry  Capillary Refill: Capillary refill takes less than 2 seconds  Coloration: Skin is not pale  Findings: No erythema or rash  Neurological:      Mental Status: He is alert and oriented to person, place, and time  Psychiatric:         Behavior: Behavior normal          Thought Content:  Thought content normal          Judgment: Judgment normal           Kristen Reece PA-C  [address] [facility]

## 2022-03-29 ENCOUNTER — VBI (OUTPATIENT)
Dept: ADMINISTRATIVE | Facility: OTHER | Age: 57
End: 2022-03-29

## 2022-04-15 ENCOUNTER — TELEPHONE (OUTPATIENT)
Dept: FAMILY MEDICINE CLINIC | Facility: CLINIC | Age: 57
End: 2022-04-15

## 2022-04-15 NOTE — TELEPHONE ENCOUNTER
CC: Burning in mouth & tonsils feel sore - S/S started 4-5 days ago - Hx Allergies - COVID home test neg (-)     Asking for recommendations - Pt doesn't understand the burning in his mouth - No discoloration in mouth    Allergies: Aspirin & Penicillin    Pharm: [facility]

## 2022-04-19 ENCOUNTER — OFFICE VISIT (OUTPATIENT)
Dept: FAMILY MEDICINE CLINIC | Facility: CLINIC | Age: 57
End: 2022-04-19
Payer: COMMERCIAL

## 2022-04-19 VITALS
DIASTOLIC BLOOD PRESSURE: 88 MMHG | HEART RATE: 63 BPM | OXYGEN SATURATION: 96 % | BODY MASS INDEX: 32.79 KG/M2 | TEMPERATURE: 97.5 F | WEIGHT: 196.8 LBS | SYSTOLIC BLOOD PRESSURE: 128 MMHG | HEIGHT: 65 IN

## 2022-04-19 DIAGNOSIS — J01.90 ACUTE NON-RECURRENT SINUSITIS, UNSPECIFIED LOCATION: Primary | ICD-10-CM

## 2022-04-19 DIAGNOSIS — H10.33 ACUTE BACTERIAL CONJUNCTIVITIS OF BOTH EYES: ICD-10-CM

## 2022-04-19 PROCEDURE — 3008F BODY MASS INDEX DOCD: CPT | Performed by: PHYSICIAN ASSISTANT

## 2022-04-19 PROCEDURE — 1036F TOBACCO NON-USER: CPT | Performed by: PHYSICIAN ASSISTANT

## 2022-04-19 PROCEDURE — 99214 OFFICE O/P EST MOD 30 MIN: CPT | Performed by: PHYSICIAN ASSISTANT

## 2022-04-19 RX ORDER — FLUTICASONE PROPIONATE 50 MCG
2 SPRAY, SUSPENSION (ML) NASAL DAILY
COMMUNITY
Start: 2022-04-15

## 2022-04-19 RX ORDER — TOBRAMYCIN 3 MG/ML
1 SOLUTION/ DROPS OPHTHALMIC
Qty: 5 ML | Refills: 0 | Status: SHIPPED | OUTPATIENT
Start: 2022-04-19

## 2022-04-19 RX ORDER — AZITHROMYCIN 250 MG/1
TABLET, FILM COATED ORAL
Qty: 6 TABLET | Refills: 0 | Status: SHIPPED | OUTPATIENT
Start: 2022-04-19 | End: 2022-04-23

## 2022-04-19 NOTE — PROGRESS NOTES
Assessment/Plan:    Problem List Items Addressed This Visit     None      Visit Diagnoses     Acute non-recurrent sinusitis, unspecified location    -  Primary    Relevant Medications    azithromycin (Zithromax) 250 mg tablet    Acute bacterial conjunctivitis of both eyes        Relevant Medications    azithromycin (Zithromax) 250 mg tablet    tobramycin (TOBREX) 0 3 % SOLN           Diagnoses and all orders for this visit:    Acute non-recurrent sinusitis, unspecified location  -     azithromycin (Zithromax) 250 mg tablet; Day 1 take 2 tablets, days 2-5 take 1 tablet  Acute bacterial conjunctivitis of both eyes  -     tobramycin (TOBREX) 0 3 % SOLN; Administer 1 drop to both eyes every 4 (four) hours while awake    Other orders  -     fluticasone (FLONASE) 50 mcg/act nasal spray; 2 sprays into each nostril daily              Subjective:      Patient ID: Jackie Mills is a 56 y o  male  Darwin is here today with persistent cold symptoms  Was evaluated in Urgent Care, given prednisone which did not offer much relief  Pt complaining of sore throat, cough, and irritated eyes  Eyes were crusted shut this morning  Also with nasal congestion  Home Covid test was negative  The following portions of the patient's history were reviewed and updated as appropriate:   He has a past medical history of Hyperlipidemia ,  does not have any pertinent problems on file  ,   has a past surgical history that includes No past surgeries  ,  family history includes Hyperlipidemia in his family; Hypertension in his mother  ,   reports that he has quit smoking  He has never used smokeless tobacco  He reports current alcohol use  He reports that he does not use drugs  ,  is allergic to aspirin and penicillins     Current Outpatient Medications   Medication Sig Dispense Refill    fluticasone (FLONASE) 50 mcg/act nasal spray 2 sprays into each nostril daily      simvastatin (ZOCOR) 20 mg tablet Take 1 tablet (20 mg total) by mouth daily 90 tablet 1    azithromycin (Zithromax) 250 mg tablet Day 1 take 2 tablets, days 2-5 take 1 tablet  6 tablet 0    tobramycin (TOBREX) 0 3 % SOLN Administer 1 drop to both eyes every 4 (four) hours while awake 5 mL 0     No current facility-administered medications for this visit  Review of Systems   Constitutional: Negative for activity change, appetite change, chills, diaphoresis, fatigue, fever and unexpected weight change  HENT: Positive for congestion, postnasal drip, rhinorrhea, sinus pressure and sore throat  Negative for ear pain, sinus pain, sneezing, tinnitus and voice change  Eyes: Positive for discharge and redness  Negative for pain and visual disturbance  Respiratory: Positive for cough  Negative for chest tightness, shortness of breath and wheezing  Cardiovascular: Negative for chest pain, palpitations and leg swelling  Gastrointestinal: Negative for abdominal pain, blood in stool, constipation, diarrhea, nausea and vomiting  Genitourinary: Negative for difficulty urinating, dysuria, frequency, hematuria and urgency  Musculoskeletal: Negative for arthralgias, back pain, gait problem, joint swelling, myalgias, neck pain and neck stiffness  Skin: Negative for color change, pallor, rash and wound  Neurological: Negative for dizziness, tremors, weakness, light-headedness and headaches  Psychiatric/Behavioral: Negative for dysphoric mood, self-injury, sleep disturbance and suicidal ideas  The patient is not nervous/anxious  Objective:  Vitals:    04/19/22 1339   BP: 128/88   Pulse: 63   Temp: 97 5 °F (36 4 °C)   SpO2: 96%   Weight: 89 3 kg (196 lb 12 8 oz)   Height: 5' 5" (1 651 m)     Body mass index is 32 75 kg/m²  Physical Exam  Vitals reviewed  Constitutional:       General: He is not in acute distress  Appearance: He is well-developed  He is not diaphoretic  HENT:      Head: Normocephalic and atraumatic        Right Ear: Hearing, tympanic membrane, ear canal and external ear normal       Left Ear: Hearing, tympanic membrane, ear canal and external ear normal       Mouth/Throat:      Pharynx: Uvula midline  Posterior oropharyngeal erythema present  No oropharyngeal exudate  Eyes:      General: No scleral icterus  Right eye: No discharge  Left eye: No discharge  Conjunctiva/sclera:      Right eye: Right conjunctiva is injected  No exudate  Left eye: Left conjunctiva is injected  No exudate  Neck:      Thyroid: No thyromegaly  Vascular: No carotid bruit  Cardiovascular:      Rate and Rhythm: Normal rate and regular rhythm  Heart sounds: Normal heart sounds  No murmur heard  Pulmonary:      Effort: Pulmonary effort is normal  No respiratory distress  Breath sounds: Normal breath sounds  No wheezing  Abdominal:      General: Bowel sounds are normal  There is no distension  Palpations: Abdomen is soft  There is no mass  Tenderness: There is no abdominal tenderness  There is no guarding or rebound  Musculoskeletal:         General: No tenderness  Normal range of motion  Cervical back: Neck supple  Lymphadenopathy:      Cervical: Cervical adenopathy present  Skin:     General: Skin is warm and dry  Findings: No erythema or rash  Neurological:      Mental Status: He is alert and oriented to person, place, and time  Psychiatric:         Behavior: Behavior normal          Thought Content:  Thought content normal          Judgment: Judgment normal

## 2022-04-21 ENCOUNTER — TELEPHONE (OUTPATIENT)
Dept: FAMILY MEDICINE CLINIC | Facility: CLINIC | Age: 57
End: 2022-04-21

## 2022-04-21 DIAGNOSIS — R05.9 COUGH: Primary | ICD-10-CM

## 2022-04-21 RX ORDER — BENZONATATE 100 MG/1
100 CAPSULE ORAL 3 TIMES DAILY PRN
Qty: 30 CAPSULE | Refills: 0 | Status: SHIPPED | OUTPATIENT
Start: 2022-04-21

## 2022-04-21 NOTE — TELEPHONE ENCOUNTER
CC: Tickle in tonsils that causes coughing for a couple minutes until he can get it back under control   - Happening frequently during night & day - loosing sleep coughing  - Sinuses are clear   - ENT appt  5/12/22 - can't wait that long    Asking for recommendations or Rx to relieve the cough - Tried Mucinex NO relief

## 2022-04-25 DIAGNOSIS — J01.90 ACUTE NON-RECURRENT SINUSITIS, UNSPECIFIED LOCATION: Primary | ICD-10-CM

## 2022-06-27 ENCOUNTER — TELEPHONE (OUTPATIENT)
Dept: ADMINISTRATIVE | Facility: OTHER | Age: 57
End: 2022-06-27

## 2022-06-27 NOTE — TELEPHONE ENCOUNTER
06/27/22 12:25 PM    The patient was called and reminded about the open Cologuard order  Instructed to call the PCP office if there are any questions about completing the CRC screen  Thank you    Gerda Gabriel [facility]

## 2022-09-15 DIAGNOSIS — E78.5 HYPERLIPIDEMIA, UNSPECIFIED HYPERLIPIDEMIA TYPE: ICD-10-CM

## 2022-09-15 RX ORDER — SIMVASTATIN 20 MG
TABLET ORAL
Qty: 90 TABLET | Refills: 1 | Status: SHIPPED | OUTPATIENT
Start: 2022-09-15

## 2022-09-16 LAB — COLOGUARD RESULT REPORTABLE: NEGATIVE

## 2023-03-28 DIAGNOSIS — E78.5 HYPERLIPIDEMIA, UNSPECIFIED HYPERLIPIDEMIA TYPE: ICD-10-CM

## 2023-03-28 RX ORDER — SIMVASTATIN 20 MG
20 TABLET ORAL DAILY
Qty: 90 TABLET | Refills: 0 | Status: SHIPPED | OUTPATIENT
Start: 2023-03-28

## 2023-03-28 RX ORDER — SIMVASTATIN 20 MG
TABLET ORAL
Qty: 90 TABLET | Refills: 1 | OUTPATIENT
Start: 2023-03-28

## 2023-05-19 ENCOUNTER — OFFICE VISIT (OUTPATIENT)
Dept: FAMILY MEDICINE CLINIC | Facility: CLINIC | Age: 58
End: 2023-05-19

## 2023-05-19 VITALS
TEMPERATURE: 97.7 F | HEART RATE: 64 BPM | SYSTOLIC BLOOD PRESSURE: 122 MMHG | DIASTOLIC BLOOD PRESSURE: 76 MMHG | OXYGEN SATURATION: 97 % | BODY MASS INDEX: 33.12 KG/M2 | WEIGHT: 198.8 LBS | HEIGHT: 65 IN

## 2023-05-19 DIAGNOSIS — R14.0 ABDOMINAL BLOATING: Primary | ICD-10-CM

## 2023-05-19 NOTE — PROGRESS NOTES
BMI Counseling: Body mass index is 33 08 kg/m²  The BMI is above normal  Nutrition recommendations include decreasing portion sizes, encouraging healthy choices of fruits and vegetables, moderation in carbohydrate intake and increasing intake of lean protein  Exercise recommendations include moderate physical activity 150 minutes/week  Rationale for BMI follow-up plan is due to patient being overweight or obese  Depression Screening and Follow-up Plan: Patient was screened for depression during today's encounter  They screened negative with a PHQ-2 score of 0  Assessment/Plan: Patient needs an ultrasound gallbladder pancreas liver CBC amylase lipase and chemistry profile he will try to eat a lean diet stay away from greasy foods or fried foods and he will stop taking his vitamin supplements just in case his symptoms are being caused by vitamins    Problem List Items Addressed This Visit    None  Visit Diagnoses     Abdominal bloating    -  Primary           Diagnoses and all orders for this visit:    Abdominal bloating        No problem-specific Assessment & Plan notes found for this encounter  Subjective:      Patient ID: Chantal Villarreal is a 66 y o  male      Mr Saldana Ahr is here he noticed over the past month or 2 that he has had some abdominal bloating actually went on vacation to see his family in [place] try to lose a little weight before he went did so had a great vacation and ate a lot while he was over in [place] when he came back he tried to go back to some intermittent fasting to lose weight but he notices now that anything he eats causes him to have abdominal bloating and discomfort he denies any light acholic stools he denies any dark tea colored urine no blood no mucus no vomiting no nausea just excessive bloating whenever he eats he does have intermittent loose stools that float on the surface of the toilet water other than that he has no other symptoms except that when he bloats excessively he actually gets back pain      The following portions of the patient's history were reviewed and updated as appropriate:   He has a past medical history of Hyperlipidemia ,  does not have any pertinent problems on file  ,   has a past surgical history that includes No past surgeries  ,  family history includes Hyperlipidemia in his family; Hypertension in his mother  ,   reports that he quit smoking about 18 years ago  His smoking use included cigarettes  He started smoking about 43 years ago  He has never used smokeless tobacco  He reports current alcohol use  He reports that he does not use drugs  ,  is allergic to aspirin, penicillins, and pollen extract     Current Outpatient Medications   Medication Sig Dispense Refill   • simvastatin (ZOCOR) 20 mg tablet Take 1 tablet (20 mg total) by mouth daily 90 tablet 0     No current facility-administered medications for this visit  Review of Systems   Constitutional: Negative for activity change, appetite change, diaphoresis, fatigue and fever  HENT: Negative  Eyes: Negative  Respiratory: Negative for apnea, cough, chest tightness, shortness of breath and wheezing  Cardiovascular: Negative for chest pain, palpitations and leg swelling  Gastrointestinal: Negative for abdominal distention, abdominal pain, anal bleeding, constipation, diarrhea, nausea and vomiting  Abdominal bloating postprandial   Endocrine: Negative for cold intolerance, heat intolerance, polydipsia, polyphagia and polyuria  Genitourinary: Negative for difficulty urinating, dysuria, flank pain, hematuria and urgency  Musculoskeletal: Negative for arthralgias, back pain, gait problem, joint swelling and myalgias  Skin: Negative for color change, rash and wound  Allergic/Immunologic: Negative for environmental allergies, food allergies and immunocompromised state  Neurological: Negative for dizziness, seizures, syncope, speech difficulty, numbness and headaches  "  Hematological: Negative for adenopathy  Does not bruise/bleed easily  Psychiatric/Behavioral: Negative for agitation, behavioral problems, hallucinations, sleep disturbance and suicidal ideas  Objective:  Vitals:    05/19/23 0746   BP: 122/76   BP Location: Left arm   Patient Position: Sitting   Cuff Size: Large   Pulse: 64   Temp: 97 7 °F (36 5 °C)   TempSrc: Temporal   SpO2: 97%   Weight: 90 2 kg (198 lb 12 8 oz)   Height: 5' 5\" (1 651 m)     Body mass index is 33 08 kg/m²  Physical Exam  Constitutional:       General: He is not in acute distress  Appearance: He is well-developed  He is not diaphoretic  HENT:      Head: Normocephalic  Right Ear: External ear normal       Left Ear: External ear normal       Nose: Nose normal    Eyes:      General: No scleral icterus  Right eye: No discharge  Left eye: No discharge  Conjunctiva/sclera: Conjunctivae normal       Pupils: Pupils are equal, round, and reactive to light  Neck:      Thyroid: No thyromegaly  Trachea: No tracheal deviation  Cardiovascular:      Rate and Rhythm: Normal rate and regular rhythm  Heart sounds: Normal heart sounds  No murmur heard  No friction rub  No gallop  Pulmonary:      Effort: Pulmonary effort is normal  No respiratory distress  Breath sounds: Normal breath sounds  No wheezing  Abdominal:      General: Bowel sounds are normal       Palpations: Abdomen is soft  There is no mass  Tenderness: There is no abdominal tenderness  There is no guarding  Musculoskeletal:         General: No deformity  Cervical back: Normal range of motion  Lymphadenopathy:      Cervical: No cervical adenopathy  Skin:     General: Skin is warm and dry  Findings: No erythema or rash  Neurological:      Mental Status: He is alert and oriented to person, place, and time  Cranial Nerves: No cranial nerve deficit  Psychiatric:         Thought Content:  Thought content " normal

## 2023-05-22 ENCOUNTER — LAB (OUTPATIENT)
Dept: LAB | Facility: MEDICAL CENTER | Age: 58
End: 2023-05-22

## 2023-05-22 ENCOUNTER — HOSPITAL ENCOUNTER (OUTPATIENT)
Dept: ULTRASOUND IMAGING | Facility: HOSPITAL | Age: 58
Discharge: HOME | End: 2023-05-22
Attending: FAMILY MEDICINE

## 2023-05-22 DIAGNOSIS — R14.0 ABDOMINAL BLOATING: ICD-10-CM

## 2023-05-22 LAB
ALBUMIN SERPL BCP-MCNC: 3.8 G/DL (ref 3.5–5)
ALP SERPL-CCNC: 62 U/L (ref 46–116)
ALT SERPL W P-5'-P-CCNC: 48 U/L (ref 12–78)
AMYLASE SERPL-CCNC: 77 IU/L (ref 25–115)
ANION GAP SERPL CALCULATED.3IONS-SCNC: 1 MMOL/L (ref 4–13)
AST SERPL W P-5'-P-CCNC: 21 U/L (ref 5–45)
BASOPHILS # BLD AUTO: 0.03 THOUSANDS/ÂΜL (ref 0–0.1)
BASOPHILS NFR BLD AUTO: 1 % (ref 0–1)
BILIRUB SERPL-MCNC: 0.57 MG/DL (ref 0.2–1)
BUN SERPL-MCNC: 18 MG/DL (ref 5–25)
CALCIUM SERPL-MCNC: 8.9 MG/DL (ref 8.3–10.1)
CHLORIDE SERPL-SCNC: 112 MMOL/L (ref 96–108)
CO2 SERPL-SCNC: 23 MMOL/L (ref 21–32)
CREAT SERPL-MCNC: 0.98 MG/DL (ref 0.6–1.3)
EOSINOPHIL # BLD AUTO: 0.24 THOUSAND/ÂΜL (ref 0–0.61)
EOSINOPHIL NFR BLD AUTO: 5 % (ref 0–6)
ERYTHROCYTE [DISTWIDTH] IN BLOOD BY AUTOMATED COUNT: 13.7 % (ref 11.6–15.1)
GFR SERPL CREATININE-BSD FRML MDRD: 85 ML/MIN/1.73SQ M
GLUCOSE P FAST SERPL-MCNC: 91 MG/DL (ref 65–99)
HCT VFR BLD AUTO: 43.2 % (ref 36.5–49.3)
HGB BLD-MCNC: 14.9 G/DL (ref 12–17)
IMM GRANULOCYTES # BLD AUTO: 0.01 THOUSAND/UL (ref 0–0.2)
IMM GRANULOCYTES NFR BLD AUTO: 0 % (ref 0–2)
LIPASE SERPL-CCNC: 145 U/L (ref 73–393)
LYMPHOCYTES # BLD AUTO: 1.88 THOUSANDS/ÂΜL (ref 0.6–4.47)
LYMPHOCYTES NFR BLD AUTO: 35 % (ref 14–44)
MCH RBC QN AUTO: 31.1 PG (ref 26.8–34.3)
MCHC RBC AUTO-ENTMCNC: 34.5 G/DL (ref 31.4–37.4)
MCV RBC AUTO: 90 FL (ref 82–98)
MONOCYTES # BLD AUTO: 0.38 THOUSAND/ÂΜL (ref 0.17–1.22)
MONOCYTES NFR BLD AUTO: 7 % (ref 4–12)
NEUTROPHILS # BLD AUTO: 2.77 THOUSANDS/ÂΜL (ref 1.85–7.62)
NEUTS SEG NFR BLD AUTO: 52 % (ref 43–75)
NRBC BLD AUTO-RTO: 0 /100 WBCS
PLATELET # BLD AUTO: 182 THOUSANDS/UL (ref 149–390)
PMV BLD AUTO: 11.1 FL (ref 8.9–12.7)
POTASSIUM SERPL-SCNC: 3.8 MMOL/L (ref 3.5–5.3)
PROT SERPL-MCNC: 7.3 G/DL (ref 6.4–8.4)
RBC # BLD AUTO: 4.79 MILLION/UL (ref 3.88–5.62)
SODIUM SERPL-SCNC: 136 MMOL/L (ref 135–147)
WBC # BLD AUTO: 5.31 THOUSAND/UL (ref 4.31–10.16)

## 2023-05-24 NOTE — RESULT ENCOUNTER NOTE
Please call the patient regarding his abnormal result    Patient has a fatty liver the treatment is weight reduction avoiding alcohol trying to cut back on carbohydrates

## 2023-08-05 DIAGNOSIS — E78.5 HYPERLIPIDEMIA, UNSPECIFIED HYPERLIPIDEMIA TYPE: ICD-10-CM

## 2023-08-07 RX ORDER — SIMVASTATIN 20 MG
20 TABLET ORAL DAILY
Qty: 90 TABLET | Refills: 0 | Status: SHIPPED | OUTPATIENT
Start: 2023-08-07

## 2023-11-16 DIAGNOSIS — E78.5 HYPERLIPIDEMIA, UNSPECIFIED HYPERLIPIDEMIA TYPE: ICD-10-CM

## 2023-11-16 RX ORDER — SIMVASTATIN 20 MG
20 TABLET ORAL DAILY
Qty: 90 TABLET | Refills: 0 | Status: SHIPPED | OUTPATIENT
Start: 2023-11-16

## 2023-11-28 ENCOUNTER — OFFICE VISIT (OUTPATIENT)
Dept: FAMILY MEDICINE CLINIC | Facility: CLINIC | Age: 58
End: 2023-11-28
Payer: COMMERCIAL

## 2023-11-28 VITALS
OXYGEN SATURATION: 96 % | HEIGHT: 65 IN | TEMPERATURE: 97.5 F | DIASTOLIC BLOOD PRESSURE: 80 MMHG | WEIGHT: 206 LBS | SYSTOLIC BLOOD PRESSURE: 126 MMHG | HEART RATE: 79 BPM | BODY MASS INDEX: 34.32 KG/M2

## 2023-11-28 DIAGNOSIS — R74.01 TRANSAMINASEMIA: ICD-10-CM

## 2023-11-28 DIAGNOSIS — K21.9 GASTROESOPHAGEAL REFLUX DISEASE, UNSPECIFIED WHETHER ESOPHAGITIS PRESENT: ICD-10-CM

## 2023-11-28 DIAGNOSIS — E78.2 MIXED HYPERLIPIDEMIA: ICD-10-CM

## 2023-11-28 DIAGNOSIS — Z12.12 SCREENING FOR COLORECTAL CANCER: ICD-10-CM

## 2023-11-28 DIAGNOSIS — E78.5 HYPERLIPIDEMIA, UNSPECIFIED HYPERLIPIDEMIA TYPE: ICD-10-CM

## 2023-11-28 DIAGNOSIS — Z00.00 ANNUAL PHYSICAL EXAM: Primary | ICD-10-CM

## 2023-11-28 DIAGNOSIS — Z12.11 SCREENING FOR COLORECTAL CANCER: ICD-10-CM

## 2023-11-28 PROCEDURE — 99396 PREV VISIT EST AGE 40-64: CPT | Performed by: FAMILY MEDICINE

## 2023-11-28 RX ORDER — SIMVASTATIN 20 MG
20 TABLET ORAL DAILY
Qty: 90 TABLET | Refills: 2 | Status: SHIPPED | OUTPATIENT
Start: 2023-11-28

## 2023-11-28 NOTE — PATIENT INSTRUCTIONS
Wellness Visit for Adults   AMBULATORY CARE:   A wellness visit  is when you see your healthcare provider to get screened for health problems. Your healthcare provider will also give you advice on how to stay healthy. Write down your questions so you remember to ask them. Ask your healthcare provider how often you should have a wellness visit. What happens at a wellness visit:  Your healthcare provider will ask about your health, and your family history of health problems. This includes high blood pressure, heart disease, and cancer. He or she will ask if you have symptoms that concern you, if you smoke, and about your mood. You may also be asked about your intake of medicines, supplements, food, and alcohol. Any of the following may be done: Your weight  will be checked. Your height may also be checked so your body mass index (BMI) can be calculated. Your BMI shows if you are at a healthy weight. Your blood pressure  and heart rate will be checked. Your temperature may also be checked. Blood and urine tests  may be done. Blood tests may be done to check your cholesterol levels. Abnormal cholesterol levels increase your risk for heart disease and stroke. You may also need a blood or urine test to check for diabetes if you are at increased risk. Urine tests may be done to look for signs of an infection or kidney disease. A physical exam  includes checking your heartbeat and lungs with a stethoscope. Your healthcare provider may also check your skin to look for sun damage. Screening tests  may be recommended. A screening test is done to check for diseases that may not cause symptoms. The screening tests you may need depend on your age, gender, family history, and lifestyle habits. For example, colorectal screening may be recommended if you are 48years old or older. Screening tests you need if you are a woman:   A Pap smear  is used to screen for cervical cancer.  Pap smears are usually done every 3 to 5 years depending on your age. You may need them more often if you have had abnormal Pap smear test results in the past. Ask your healthcare provider how often you should have a Pap smear. A mammogram  is an x-ray of your breasts to screen for breast cancer. Experts recommend mammograms every 2 years starting at age 48 years. You may need a mammogram at age 52 years or younger if you have an increased risk for breast cancer. Talk to your healthcare provider about when you should start having mammograms and how often you need them. Vaccines you may need:   Get an influenza vaccine  every year. The influenza vaccine protects you from the flu. Several types of viruses cause the flu. The viruses change over time, so new vaccines are made each year. Get a tetanus-diphtheria (Td) booster vaccine  every 10 years. This vaccine protects you against tetanus and diphtheria. Tetanus is a severe infection that may cause painful muscle spasms and lockjaw. Diphtheria is a severe bacterial infection that causes a thick covering in the back of your mouth and throat. Get a human papillomavirus (HPV) vaccine  if you are female and aged 23 to 32 or male 23 to 24 and never received it. This vaccine protects you from HPV infection. HPV is the most common infection spread by sexual contact. HPV may also cause vaginal, penile, and anal cancers. Get a pneumococcal vaccine  if you are aged 72 years or older. The pneumococcal vaccine is an injection given to protect you from pneumococcal disease. Pneumococcal disease is an infection caused by pneumococcal bacteria. The infection may cause pneumonia, meningitis, or an ear infection. Get a shingles vaccine  if you are 60 or older, even if you have had shingles before. The shingles vaccine is an injection to protect you from the varicella-zoster virus. This is the same virus that causes chickenpox.  Shingles is a painful rash that develops in people who had chickenpox or have been exposed to the virus. How to eat healthy:  My Plate is a model for planning healthy meals. It shows the types and amounts of foods that should go on your plate. Fruits and vegetables make up about half of your plate, and grains and protein make up the other half. A serving of dairy is included on the side of your plate. The amount of calories and serving sizes you need depends on your age, gender, weight, and height. Examples of healthy foods are listed below:  Eat a variety of vegetables  such as dark green, red, and orange vegetables. You can also include canned vegetables low in sodium (salt) and frozen vegetables without added butter or sauces. Eat a variety of fresh fruits , canned fruit in 100% juice, frozen fruit, and dried fruit. Include whole grains. At least half of the grains you eat should be whole grains. Examples include whole-wheat bread, wheat pasta, brown rice, and whole-grain cereals such as oatmeal.    Eat a variety of protein foods such as seafood (fish and shellfish), lean meat, and poultry without skin (turkey and chicken). Examples of lean meats include pork leg, shoulder, or tenderloin, and beef round, sirloin, tenderloin, and extra lean ground beef. Other protein foods include eggs and egg substitutes, beans, peas, soy products, nuts, and seeds. Choose low-fat dairy products such as skim or 1% milk or low-fat yogurt, cheese, and cottage cheese. Limit unhealthy fats  such as butter, hard margarine, and shortening. Exercise:  Exercise at least 30 minutes per day on most days of the week. Some examples of exercise include walking, biking, dancing, and swimming. You can also fit in more physical activity by taking the stairs instead of the elevator or parking farther away from stores. Include muscle strengthening activities 2 days each week. Regular exercise provides many health benefits.  It helps you manage your weight, and decreases your risk for type 2 diabetes, heart disease, stroke, and high blood pressure. Exercise can also help improve your mood. Ask your healthcare provider about the best exercise plan for you. General health and safety guidelines:   Do not smoke. Nicotine and other chemicals in cigarettes and cigars can cause lung damage. Ask your healthcare provider for information if you currently smoke and need help to quit. E-cigarettes or smokeless tobacco still contain nicotine. Talk to your healthcare provider before you use these products. Limit alcohol. A drink of alcohol is 12 ounces of beer, 5 ounces of wine, or 1½ ounces of liquor. Lose weight, if needed. Being overweight increases your risk of certain health conditions. These include heart disease, high blood pressure, type 2 diabetes, and certain types of cancer. Protect your skin. Do not sunbathe or use tanning beds. Use sunscreen with a SPF 15 or higher. Apply sunscreen at least 15 minutes before you go outside. Reapply sunscreen every 2 hours. Wear protective clothing, hats, and sunglasses when you are outside. Drive safely. Always wear your seatbelt. Make sure everyone in your car wears a seatbelt. A seatbelt can save your life if you are in an accident. Do not use your cell phone when you are driving. This could distract you and cause an accident. Pull over if you need to make a call or send a text message. Practice safe sex. Use latex condoms if are sexually active and have more than one partner. Your healthcare provider may recommend screening tests for sexually transmitted infections (STIs). Wear helmets, lifejackets, and protective gear. Always wear a helmet when you ride a bike or motorcycle, go skiing, or play sports that could cause a head injury. Wear protective equipment when you play sports. Wear a lifejacket when you are on a boat or doing water sports.     © Copyright [employer] 2023 Information is for End User's use only and may not be sold, redistributed or otherwise used for commercial purposes. The above information is an  only. It is not intended as medical advice for individual conditions or treatments. Talk to your doctor, nurse or pharmacist before following any medical regimen to see if it is safe and effective for you.

## 2023-11-28 NOTE — PROGRESS NOTES
[facility] PRIMARY CARE    NAME: Darwin Arellano  AGE: 66 y.o. SEX: male  : 1965     DATE: 2023     Assessment and Plan:     Problem List Items Addressed This Visit          Other    Hyperlipidemia       Immunizations and preventive care screenings were discussed with patient today. Appropriate education was printed on patient's after visit summary. Discussed risks and benefits of prostate cancer screening. We discussed the controversial history of PSA screening for prostate cancer in the [facility] as well as the risk of over detection and over treatment of prostate cancer by way of PSA screening. The patient understands that PSA blood testing is an imperfect way to screen for prostate cancer and that elevated PSA levels in the blood may also be caused by infection, inflammation, prostatic trauma or manipulation, urological procedures, or by benign prostatic enlargement. The role of the digital rectal examination in prostate cancer screening was also discussed and I discussed with him that there is large interobserver variability in the findings of digital rectal examination. Counseling:  Alcohol/drug use: discussed moderation in alcohol intake, the recommendations for healthy alcohol use, and avoidance of illicit drug use. Dental Health: discussed importance of regular tooth brushing, flossing, and dental visits. Injury prevention: discussed safety/seat belts, safety helmets, smoke detectors, carbon dioxide detectors, and smoking near bedding or upholstery. Sexual health: discussed sexually transmitted diseases, partner selection, use of condoms, avoidance of unintended pregnancy, and contraceptive alternatives. Exercise: the importance of regular exercise/physical activity was discussed. Recommend exercise 3-5 times per week for at least 30 minutes. No follow-ups on file.      Chief Complaint:     Chief Complaint Patient presents with    Hypertension     Running 150-170 / 89-90      History of Present Illness:     Adult Annual Physical   Patient here for a comprehensive physical exam. The patient reports no problems. Diet and Physical Activity  Diet/Nutrition: poor diet. Exercise: walking. Depression Screening  PHQ-2/9 Depression Screening    Little interest or pleasure in doing things: 0 - not at all  Feeling down, depressed, or hopeless: 0 - not at all  PHQ-2 Score: 0  PHQ-2 Interpretation: Negative depression screen       General Health  Sleep: gets 7-8 hours of sleep on average. Hearing: normal - bilateral.  Vision: no vision problems. Dental: regular dental visits.  Health  Symptoms include: none    Advanced Care Planning  Do you have an advanced directive? no  Do you have a durable medical power of ?  no     Review of Systems:     Review of Systems   Past Medical History:     Past Medical History:   Diagnosis Date    Hyperlipidemia       Past Surgical History:     Past Surgical History:   Procedure Laterality Date    NO PAST SURGERIES        Family History:     Family History   Problem Relation Age of Onset    Hypertension Mother     Hyperlipidemia Family       Social History:     Social History     Socioeconomic History    Marital status: Single     Spouse name: None    Number of children: None    Years of education: None    Highest education level: None   Occupational History    None   Tobacco Use    Smoking status: Former     Types: Cigarettes     Start date:      Quit date:      Years since quittin.9    Smokeless tobacco: Never   Substance and Sexual Activity    Alcohol use: Yes    Drug use: No    Sexual activity: None   Other Topics Concern    None   Social History Narrative    No living will     Social Determinants of Health     Financial Resource Strain: Not on file   Food Insecurity: Not on file   Transportation Needs: Not on file   Physical Activity: Not on file Stress: Not on file   Social Connections: Not on file   Intimate Partner Violence: Not on file   Housing Stability: Not on file      Current Medications:     Current Outpatient Medications   Medication Sig Dispense Refill    simvastatin (ZOCOR) 20 mg tablet TAKE 1 TABLET BY MOUTH EVERY DAY 90 tablet 0     No current facility-administered medications for this visit. Allergies:      Allergies   Allergen Reactions    Aspirin     Penicillins     Pollen Extract Allergic Rhinitis      Physical Exam:     /80 (BP Location: Left arm, Patient Position: Sitting, Cuff Size: Large)   Pulse 79   Temp 97.5 °F (36.4 °C) (Temporal)   Ht 5' 5" (1.651 m)   Wt 93.4 kg (206 lb)   SpO2 96%   BMI 34.28 kg/m²     Physical Exam     DO Kana Padilla

## 2023-12-01 ENCOUNTER — TELEPHONE (OUTPATIENT)
Dept: FAMILY MEDICINE CLINIC | Facility: CLINIC | Age: 58
End: 2023-12-01

## 2023-12-01 NOTE — TELEPHONE ENCOUNTER
PATIENT WOULD  LIKE A REFERRAL TO [facility] . Janelle Washington HE WOULD LIKE TO SWITCH FROM [facility].

## 2023-12-04 DIAGNOSIS — Z12.12 SCREENING FOR COLORECTAL CANCER: Primary | ICD-10-CM

## 2023-12-04 DIAGNOSIS — Z12.11 SCREENING FOR COLORECTAL CANCER: Primary | ICD-10-CM

## 2023-12-04 NOTE — TELEPHONE ENCOUNTER
I spoke with pt his order for [facility] is for a colon and endoscope   DX screening for colon cancer & reflux / esophagitis

## 2024-01-02 ENCOUNTER — LAB (OUTPATIENT)
Dept: LAB | Facility: MEDICAL CENTER | Age: 59
End: 2024-01-02
Payer: COMMERCIAL

## 2024-01-02 DIAGNOSIS — R74.01 TRANSAMINASEMIA: ICD-10-CM

## 2024-01-02 DIAGNOSIS — E78.2 MIXED HYPERLIPIDEMIA: ICD-10-CM

## 2024-01-02 PROCEDURE — 36415 COLL VENOUS BLD VENIPUNCTURE: CPT

## 2024-01-02 PROCEDURE — 80061 LIPID PANEL: CPT

## 2024-01-02 PROCEDURE — 80053 COMPREHEN METABOLIC PANEL: CPT

## 2024-01-03 LAB
ALBUMIN SERPL BCP-MCNC: 4.3 G/DL (ref 3.5–5)
ALP SERPL-CCNC: 56 U/L (ref 34–104)
ALT SERPL W P-5'-P-CCNC: 59 U/L (ref 7–52)
ANION GAP SERPL CALCULATED.3IONS-SCNC: 8 MMOL/L
AST SERPL W P-5'-P-CCNC: 34 U/L (ref 13–39)
BILIRUB SERPL-MCNC: 0.48 MG/DL (ref 0.2–1)
BUN SERPL-MCNC: 15 MG/DL (ref 5–25)
CALCIUM SERPL-MCNC: 9.3 MG/DL (ref 8.4–10.2)
CHLORIDE SERPL-SCNC: 108 MMOL/L (ref 96–108)
CHOLEST SERPL-MCNC: 186 MG/DL
CO2 SERPL-SCNC: 24 MMOL/L (ref 21–32)
CREAT SERPL-MCNC: 0.87 MG/DL (ref 0.6–1.3)
GFR SERPL CREATININE-BSD FRML MDRD: 96 ML/MIN/1.73SQ M
GLUCOSE P FAST SERPL-MCNC: 88 MG/DL (ref 65–99)
HDLC SERPL-MCNC: 69 MG/DL
LDLC SERPL CALC-MCNC: 105 MG/DL (ref 0–100)
NONHDLC SERPL-MCNC: 117 MG/DL
POTASSIUM SERPL-SCNC: 4.2 MMOL/L (ref 3.5–5.3)
PROT SERPL-MCNC: 7.2 G/DL (ref 6.4–8.4)
SODIUM SERPL-SCNC: 140 MMOL/L (ref 135–147)
TRIGL SERPL-MCNC: 62 MG/DL

## 2024-01-05 ENCOUNTER — CONSULT (OUTPATIENT)
Age: 59
End: 2024-01-05
Payer: COMMERCIAL

## 2024-01-05 VITALS
DIASTOLIC BLOOD PRESSURE: 80 MMHG | WEIGHT: 206 LBS | RESPIRATION RATE: 18 BRPM | HEIGHT: 65 IN | SYSTOLIC BLOOD PRESSURE: 132 MMHG | BODY MASS INDEX: 34.32 KG/M2

## 2024-01-05 DIAGNOSIS — Z12.11 SCREENING FOR COLORECTAL CANCER: ICD-10-CM

## 2024-01-05 DIAGNOSIS — R74.8 ELEVATED LIVER ENZYMES: ICD-10-CM

## 2024-01-05 DIAGNOSIS — K21.9 GASTROESOPHAGEAL REFLUX DISEASE, UNSPECIFIED WHETHER ESOPHAGITIS PRESENT: ICD-10-CM

## 2024-01-05 DIAGNOSIS — R16.0 HEPATOMEGALY: ICD-10-CM

## 2024-01-05 DIAGNOSIS — K76.0 STEATOSIS OF LIVER: ICD-10-CM

## 2024-01-05 DIAGNOSIS — R14.0 BLOATING: Primary | ICD-10-CM

## 2024-01-05 DIAGNOSIS — Z12.12 SCREENING FOR COLORECTAL CANCER: ICD-10-CM

## 2024-01-05 PROCEDURE — 99204 OFFICE O/P NEW MOD 45 MIN: CPT | Performed by: NURSE PRACTITIONER

## 2024-01-05 RX ORDER — FAMOTIDINE 40 MG/1
TABLET, FILM COATED ORAL
Qty: 30 TABLET | Refills: 2 | Status: SHIPPED | OUTPATIENT
Start: 2024-01-05

## 2024-01-05 RX ORDER — POLYETHYLENE GLYCOL 3350, SODIUM SULFATE ANHYDROUS, SODIUM BICARBONATE, SODIUM CHLORIDE, POTASSIUM CHLORIDE 236; 22.74; 6.74; 5.86; 2.97 G/4L; G/4L; G/4L; G/4L; G/4L
4000 POWDER, FOR SOLUTION ORAL ONCE
Qty: 4000 ML | Refills: 0 | Status: SHIPPED | OUTPATIENT
Start: 2024-01-05 | End: 2024-01-05

## 2024-01-05 NOTE — PROGRESS NOTES
[facility] - Outpatient Consultation  Darwin Arellano 58 y.o. male MRN: [id number]  Encounter: [id number]          ASSESSMENT AND PLAN:    The patient presents today for an initial consultation for his chronic bloating with intermittent reflux and intermittent left-sided abdominal pain as well as establishment of care to discuss colon cancer screening with a recent history of slightly elevated LFTs, mild hepatomegaly, and steatosis.    Exam:  Oral mucosa normal upon visual inspection, without any sores, lesions, or ulcerations. Sclera without icterus and benign. Lung sounds CTA b/l. Normal S1 & S2 upon exam. Abdomen is softly distended, without any significant tenderness noted upon exam, with faint bowel sounds x 4. No edema noted of the b/l lower extremities upon exam today. Skin is non-icteric.     1. Bloating  2. Gastroesophageal reflux disease, unspecified whether esophagitis present  While the patient was in the office today, I discussed with the patient that with their current symptoms and exam findings, I feel it would be beneficial to proceed with an EGD to further evaluate any underlying etiology that could explain their symptoms, with differential diagnoses that could include but are not limited to; GERD, gastric ulcers, Stephenson's esophagus, EOE, H. pylori, etc. They were agreeable and verbalized and understanding.     I discussed the risks of procedure with the patient including, but not limited to: bleeding, infection, sore throat, and perforation. The patient gave verbal understanding and is agreeable to proceed.      In the meantime to try to address some of the reflux symptoms and when to start him on famotidine 40 mg at bedtime until we see the results of the EGD and proceed from there as appropriate.  The patient is to contact our office if he has any side effects or issues with the changes in his medication regimen.  The patient was agreeable and  verbalized an understanding.    Encouraged the patient to avoid trigger foods is much as possible, including alcohol.    - famotidine (PEPCID) 40 MG tablet; Take 1 PO HS.  Dispense: 30 tablet; Refill: 2  - EGD; Future    3. Screening for colorectal cancer  I discussed with the patient that since he is older than 45 years old and has not had any colon cancer screening, I feel will be beneficial to proceed with a colonoscopy at the same time as the EGD.  The patient was agreeable and verbalized an understanding.    I discussed the risks of procedure with the patient including, but not limited to: bleeding, infection, perforation, and spleen injury. The patient gave verbal understanding and is agreeable to proceed. Bowel prep instructions were reviewed and given as ordered.      - Ambulatory referral to Gastroenterology  - Colonoscopy; Future  - polyethylene glycol (Golytely) 4000 mL solution; Take 4,000 mL by mouth once for 1 dose Take 4000 mL by mouth once for 1 dose. Use as directed  Dispense: 4000 mL; Refill: 0    4. Elevated liver enzymes  5. Steatosis of liver  6. Hepatomegaly    Discussed recommendations in regards to fatty liver including:   Strict control of contributing comorbidities (obesity, prediabetes/diabetes, hypertension, and hypertriglyceridemia).  Weight loss of approx 10-15% of patient's current body weight over a period of 6-12 months through low fat diet and cardiovascular exercise as tolerated.   Limiting alcohol consumption, preferably complete abstinence.  Monitor hepatic function every 6 months with routine labs.      - Bilirubin, direct; Future  - CBC and differential; Future  - Comprehensive metabolic panel; Future    The patient will schedule a follow up office visit after his EGD and Colonoscopy. The patient was agreeable and verbalized an understanding.     ______________________________________________________________________    HPI: The patient is a 58 y.o. male who presents today for a  consultation regarding his chronic bloating with intermittent reflux and intermittent left-sided abdominal pain as well as establishment of care to discuss colon cancer screening with a recent history of slightly elevated LFTs, mild hepatomegaly, and steatosis.    The patient reports that he is overdue for colonoscopy for colon cancer screening and over the past year or so has been dealing with chronic bloating and reflux symptoms especially immediately after he eats.    The patient denies any nausea, dysphagia, vomiting, decreased appetite, unplanned weight loss, or abdominal pain. Water Intake: 5 bottles daily. The patient denies eating any raw or uncooked fish, seafood, or sushi in the past 6-8 months.     The patient reports that they have a BM daily and reports that it is relieving, without any consistent diarrhea, nocturnal BMs, constipation, straining, melena or bloody stools. Last BM: Yesterday. Flatus: Yes, more than usual. .    The patient denies any evidence of jaundice, fevers, pradeep colored stools, swelling of the lower extremities, fatigue, confusion, memory loss or easy bruising.    PMH/PSH:   Abdominal/Chest Surgery: Denied  Colon Cancer: Denied  Any Cancer: Denied  Pre-Cancerous Polyps: N/A  Crohn's: Denied  Ulcerative Colitis: Denied  Stephenson's Esophagus: Denied  Celiac Disease: Denied    Tobacco/Vaping: Quit smoking 15 years ago.   ETOH: Very rarely  Marijuana: Denied  Illicit Drug Use: Denied    FH:  Colon Cancer: Denied  Any Cancer: Father; Pancreas, Sister; Breast  Family Members with Pre-Cancerous Polyps: Denied  Crohn's: Denied  Ulcerative Colitis: Denied  Celiac Disease: Denied    Meds: None  NSAID Use: Denied    Imaging: (5/22/23) U/S Abdomen: Mild hepatomegaly and steatosis.    Endoscopy History: EGD: (None):     COLONOSCOPY: (None):     DUE: NOW    REVIEW OF SYSTEMS:    CONSTITUTIONAL: Denies any fever, chills, rigors, and weight loss.  HEENT: No earache or tinnitus. Denies hearing loss or  visual disturbances.  CARDIOVASCULAR: No chest pain or palpitations.   RESPIRATORY: Denies any cough, hemoptysis, shortness of breath or dyspnea on exertion.  GASTROINTESTINAL: As noted in the History of Present Illness.   GENITOURINARY: No problems with urination. Denies any hematuria or dysuria.  NEUROLOGIC: No dizziness or vertigo, denies headaches.   MUSCULOSKELETAL: Denies any muscle or joint pain.   SKIN: Denies skin rashes or itching.   ENDOCRINE: Denies excessive thirst. Denies intolerance to heat or cold.  PSYCHOSOCIAL: Denies depression or anxiety. Denies any recent memory loss.       Historical Information   Past Medical History:   Diagnosis Date    Hyperlipidemia      Past Surgical History:   Procedure Laterality Date    NO PAST SURGERIES       Social History   Social History     Substance and Sexual Activity   Alcohol Use Yes     Social History     Substance and Sexual Activity   Drug Use No     Social History     Tobacco Use   Smoking Status Former    Current packs/day: 0.00    Types: Cigarettes    Start date:     Quit date:     Years since quittin.0   Smokeless Tobacco Never     Family History   Problem Relation Age of Onset    Hypertension Mother     Hyperlipidemia Family        Meds/Allergies       Current Outpatient Medications:     simvastatin (ZOCOR) 20 mg tablet    Allergies   Allergen Reactions    Aspirin     Penicillins     Pollen Extract Allergic Rhinitis           Objective     There were no vitals taken for this visit. There is no height or weight on file to calculate BMI.        PHYSICAL EXAM:      General Appearance:   Alert, cooperative, no distress   HEENT:   Normocephalic, atraumatic, anicteric.     Neck:  Supple, symmetrical, trachea midline   Lungs:   Clear to auscultation bilaterally; no rales, rhonchi or wheezing; respirations unlabored    Heart::   Regular rate and rhythm; no murmur, rub, or gallop.   Abdomen:   Soft, non-tender, non-distended; normal bowel sounds; no  masses, no organomegaly    Genitalia:   Deferred    Rectal:   Deferred    Extremities:  No cyanosis, clubbing or edema    Pulses:  2+ and symmetric    Skin:  No jaundice, rashes, or lesions    Lymph nodes:  No palpable cervical lymphadenopathy        Lab Results:   No visits with results within 1 Day(s) from this visit.   Latest known visit with results is:   Lab on 01/02/2024   Component Date Value    Cholesterol 01/02/2024 186     Triglycerides 01/02/2024 62     HDL, Direct 01/02/2024 69     LDL Calculated 01/02/2024 105 (H)     Non-HDL-Chol (CHOL-HDL) 01/02/2024 117     Sodium 01/02/2024 140     Potassium 01/02/2024 4.2     Chloride 01/02/2024 108     CO2 01/02/2024 24     ANION GAP 01/02/2024 8     BUN 01/02/2024 15     Creatinine 01/02/2024 0.87     Glucose, Fasting 01/02/2024 88     Calcium 01/02/2024 9.3     AST 01/02/2024 34     ALT 01/02/2024 59 (H)     Alkaline Phosphatase 01/02/2024 56     Total Protein 01/02/2024 7.2     Albumin 01/02/2024 4.3     Total Bilirubin 01/02/2024 0.48     eGFR 01/02/2024 96          Radiology Results:   No results found.

## 2024-02-21 PROBLEM — Z12.12 SCREENING FOR COLORECTAL CANCER: Status: RESOLVED | Noted: 2024-01-05 | Resolved: 2024-02-21

## 2024-02-21 PROBLEM — Z12.11 SCREENING FOR COLORECTAL CANCER: Status: RESOLVED | Noted: 2024-01-05 | Resolved: 2024-02-21

## 2024-03-04 RX ORDER — SODIUM CHLORIDE, SODIUM LACTATE, POTASSIUM CHLORIDE, CALCIUM CHLORIDE 600; 310; 30; 20 MG/100ML; MG/100ML; MG/100ML; MG/100ML
125 INJECTION, SOLUTION INTRAVENOUS CONTINUOUS
Status: CANCELLED | OUTPATIENT
Start: 2024-03-04

## 2024-03-05 ENCOUNTER — ANESTHESIA EVENT (OUTPATIENT)
Dept: PERIOP | Facility: HOSPITAL | Age: 59
End: 2024-03-05

## 2024-03-05 ENCOUNTER — ANESTHESIA (OUTPATIENT)
Dept: PERIOP | Facility: HOSPITAL | Age: 59
End: 2024-03-05

## 2024-03-05 ENCOUNTER — HOSPITAL ENCOUNTER (OUTPATIENT)
Dept: PERIOP | Facility: HOSPITAL | Age: 59
Setting detail: OUTPATIENT SURGERY
Discharge: HOME | End: 2024-03-05
Payer: COMMERCIAL

## 2024-03-05 VITALS
SYSTOLIC BLOOD PRESSURE: 129 MMHG | HEART RATE: 67 BPM | HEIGHT: 65 IN | BODY MASS INDEX: 32.65 KG/M2 | OXYGEN SATURATION: 93 % | DIASTOLIC BLOOD PRESSURE: 82 MMHG | RESPIRATION RATE: 20 BRPM | TEMPERATURE: 97.1 F | WEIGHT: 196 LBS

## 2024-03-05 DIAGNOSIS — Z12.12 SCREENING FOR COLORECTAL CANCER: ICD-10-CM

## 2024-03-05 DIAGNOSIS — Z12.11 SCREENING FOR COLORECTAL CANCER: ICD-10-CM

## 2024-03-05 DIAGNOSIS — R14.0 BLOATING: ICD-10-CM

## 2024-03-05 DIAGNOSIS — K21.9 GASTROESOPHAGEAL REFLUX DISEASE, UNSPECIFIED WHETHER ESOPHAGITIS PRESENT: ICD-10-CM

## 2024-03-05 PROCEDURE — 88342 IMHCHEM/IMCYTCHM 1ST ANTB: CPT | Performed by: PATHOLOGY

## 2024-03-05 PROCEDURE — 88305 TISSUE EXAM BY PATHOLOGIST: CPT | Performed by: PATHOLOGY

## 2024-03-05 RX ORDER — LIDOCAINE HYDROCHLORIDE 20 MG/ML
INJECTION, SOLUTION EPIDURAL; INFILTRATION; INTRACAUDAL; PERINEURAL AS NEEDED
Status: DISCONTINUED | OUTPATIENT
Start: 2024-03-05 | End: 2024-03-05

## 2024-03-05 RX ORDER — PROPOFOL 10 MG/ML
INJECTION, EMULSION INTRAVENOUS AS NEEDED
Status: DISCONTINUED | OUTPATIENT
Start: 2024-03-05 | End: 2024-03-05

## 2024-03-05 RX ORDER — SODIUM CHLORIDE, SODIUM LACTATE, POTASSIUM CHLORIDE, CALCIUM CHLORIDE 600; 310; 30; 20 MG/100ML; MG/100ML; MG/100ML; MG/100ML
125 INJECTION, SOLUTION INTRAVENOUS CONTINUOUS
Status: DISCONTINUED | OUTPATIENT
Start: 2024-03-05 | End: 2024-03-09 | Stop reason: HOSPADM

## 2024-03-05 RX ADMIN — PROPOFOL 100 MCG/KG/MIN: 10 INJECTION, EMULSION INTRAVENOUS at 09:23

## 2024-03-05 RX ADMIN — PROPOFOL 100 MG: 10 INJECTION, EMULSION INTRAVENOUS at 09:06

## 2024-03-05 RX ADMIN — SODIUM CHLORIDE, SODIUM LACTATE, POTASSIUM CHLORIDE, AND CALCIUM CHLORIDE: .6; .31; .03; .02 INJECTION, SOLUTION INTRAVENOUS at 08:31

## 2024-03-05 RX ADMIN — PROPOFOL 50 MG: 10 INJECTION, EMULSION INTRAVENOUS at 08:52

## 2024-03-05 RX ADMIN — PROPOFOL 150 MG: 10 INJECTION, EMULSION INTRAVENOUS at 08:45

## 2024-03-05 RX ADMIN — LIDOCAINE HYDROCHLORIDE 100 MG: 20 INJECTION, SOLUTION EPIDURAL; INFILTRATION; INTRACAUDAL; PERINEURAL at 08:45

## 2024-03-05 RX ADMIN — SODIUM CHLORIDE, SODIUM LACTATE, POTASSIUM CHLORIDE, AND CALCIUM CHLORIDE 125 ML/HR: .6; .31; .03; .02 INJECTION, SOLUTION INTRAVENOUS at 08:30

## 2024-03-05 RX ADMIN — PROPOFOL 100 MG: 10 INJECTION, EMULSION INTRAVENOUS at 09:05

## 2024-03-05 RX ADMIN — PROPOFOL 100 MG: 10 INJECTION, EMULSION INTRAVENOUS at 08:49

## 2024-03-05 NOTE — ANESTHESIA POSTPROCEDURE EVALUATION
Post-Op Assessment Note    CV Status:  Stable  Pain Score: 0    Pain management: adequate       Mental Status:  Awake and sleepy   Hydration Status:  Euvolemic   PONV Controlled:  Controlled   Airway Patency:  Patent     Post Op Vitals Reviewed: Yes    No anethesia notable event occurred.    Staff: CRNA           BP      Temp      Pulse     Resp      SpO2

## 2024-03-05 NOTE — ANESTHESIA PREPROCEDURE EVALUATION
Procedure:  COLONOSCOPY  EGD    Relevant Problems   CARDIO   (+) Hyperlipidemia      GI/HEPATIC   (+) Gastroesophageal reflux disease   (+) Hepatomegaly   (+) Steatosis of liver        Physical Exam    Airway    Mallampati score: II  TM Distance: >3 FB  Neck ROM: full     Dental   No notable dental hx     Cardiovascular      Pulmonary      Other Findings    Anesthesia Plan  ASA Score- 2     Anesthesia Type- IV sedation with anesthesia with ASA Monitors.         Additional Monitors:     Airway Plan:            Plan Factors-Exercise tolerance (METS): >4 METS.    Chart reviewed.    Patient summary reviewed.    Patient is not a current smoker.      There is medical exclusion for perioperative obstructive sleep apnea risk education.        Induction- intravenous.    Postoperative Plan-     Informed Consent- Anesthetic plan and risks discussed with patient.  I personally reviewed this patient with the CRNA. Discussed and agreed on the Anesthesia Plan with the CRNA..

## 2024-03-05 NOTE — H&P
"  [facility]  History & Physical     PATIENT INFO     Name: Darwin Arellano  YOB: 1965   Age: 59 y.o.   Sex: male   MRN: [id number]     HISTORY OF PRESENT ILLNESS     Darwin Arellano is a 59 y.o. year old male who presents for an colonoscopy for GERD, bloating, and colon cancer screening.  No prior colonoscopy.  No antithrombotics or anticoagulants.     REVIEW OF SYSTEMS     Per the HPI, and otherwise unremarkable.    Historical Information   Past Medical History:   Diagnosis Date    GERD (gastroesophageal reflux disease)     Hyperlipidemia      Past Surgical History:   Procedure Laterality Date    NO PAST SURGERIES       Social History   Social History     Substance and Sexual Activity   Alcohol Use Yes    Comment: rare     Social History     Substance and Sexual Activity   Drug Use No     Social History     Tobacco Use   Smoking Status Former    Current packs/day: 0.00    Types: Cigarettes    Start date:     Quit date:     Years since quittin.1   Smokeless Tobacco Never     Family History   Problem Relation Age of Onset    Hypertension Mother     Hyperlipidemia Family         MEDICATIONS & ALLERGIES     Current Outpatient Medications   Medication Instructions    famotidine (PEPCID) 40 MG tablet Take 1 PO HS.    polyethylene glycol (Golytely) 4000 mL solution 4,000 mL, Oral, Once, Take 4000 mL by mouth once for 1 dose. Use as directed    simvastatin (ZOCOR) 20 mg, Oral, Daily     Allergies   Allergen Reactions    Aspirin GI Intolerance    Penicillins Other (See Comments)     As a child, unable to remember rxn    Pollen Extract Allergic Rhinitis        PHYSICAL EXAM      Objective   Blood pressure 127/85, pulse 75, temperature (!) 96.4 °F (35.8 °C), temperature source Tympanic, resp. rate 18, height 5' 5\" (1.651 m), weight 88.9 kg (196 lb), SpO2 94%. Body mass index is 32.62 kg/m².    General Appearance:   Alert, cooperative, no distress   Lungs:   " Equal chest rise, respirations unlabored    Heart:   Regular rate and rhythm   Abdomen:   Soft, non-tender, non-distended; normal bowel sounds; no masses, no organomegaly    Extremities:   No edema       ASSESSMENT & PLAN     This is a 59 y.o. year old male here for EGD and colonoscopy, and he is stable and optimized for his procedure.      Oleg Reyes D.O.  [facility]  Division of Gastroenterology & Hepatology  Available on [employer]  [email or web address]    ** Please Note: This note is constructed using a voice recognition dictation system. **

## 2024-03-07 PROCEDURE — 88342 IMHCHEM/IMCYTCHM 1ST ANTB: CPT | Performed by: PATHOLOGY

## 2024-03-07 PROCEDURE — 88305 TISSUE EXAM BY PATHOLOGIST: CPT | Performed by: PATHOLOGY

## 2024-03-11 DIAGNOSIS — A04.8 H. PYLORI INFECTION: Primary | ICD-10-CM

## 2024-03-11 RX ORDER — OMEPRAZOLE 20 MG/1
20 CAPSULE, DELAYED RELEASE ORAL 2 TIMES DAILY
Qty: 28 CAPSULE | Refills: 0 | Status: SHIPPED | OUTPATIENT
Start: 2024-03-11 | End: 2024-03-25

## 2024-03-11 RX ORDER — METRONIDAZOLE 500 MG/1
500 TABLET ORAL 4 TIMES DAILY
Qty: 56 TABLET | Refills: 0 | Status: SHIPPED | OUTPATIENT
Start: 2024-03-11 | End: 2024-03-25

## 2024-03-11 RX ORDER — TETRACYCLINE HYDROCHLORIDE 500 MG/1
500 CAPSULE ORAL 4 TIMES DAILY
Qty: 56 CAPSULE | Refills: 0 | Status: SHIPPED | OUTPATIENT
Start: 2024-03-11 | End: 2024-03-25

## 2024-03-11 RX ORDER — BISMUTH SUBSALICYLATE 262 MG/1
524 TABLET, CHEWABLE ORAL
Qty: 30 TABLET | Refills: 0 | Status: SHIPPED | OUTPATIENT
Start: 2024-03-11 | End: 2024-03-25

## 2024-03-22 ENCOUNTER — TELEPHONE (OUTPATIENT)
Dept: GASTROENTEROLOGY | Facility: HOSPITAL | Age: 59
End: 2024-03-22

## 2024-03-22 NOTE — TELEPHONE ENCOUNTER
Patients GI provider:       Number to return call: ( [phone]    Reason for call: Pt was put on reg meds as of today. Pt wants to know if he is to continue to take what he has been on with his new med.     Scheduled procedure/appointment date if applicable: Apt/procedure

## 2024-03-25 NOTE — TELEPHONE ENCOUNTER
Consult 1/5/24 Bloating GERD  Colonoscopy/EGD 3/5/24    Pt called to report increased bloating, occasional abdominal cramping/sharp pain (last a couple of seconds) 4-5/10, headaches, dark stools after starting H pylori treatment Friday with:    Bismuth Subsalicylate 524 mg Oral 4 times daily (before meals and at bedtime)  metroNIDAZOLE 500 mg Oral 4 times daily  Omeprazole 20 mg Oral 2 times daily  Tetracycline HCl 500 mg Oral 4 times daily    Eating bland foods. Denies bleeding, weakness, fever, nausea, or vomiting. Advised dark stools common side effect of Pepto Bismol. States stools are soft. Has been taking one ABX with food. Advised to take both with food to see if there is improvement. Willing to try Gas-X.     Please advise if you have further recommendations. Pt uses [facility].

## 2024-04-03 DIAGNOSIS — R14.0 BLOATING: ICD-10-CM

## 2024-04-03 DIAGNOSIS — K21.9 GASTROESOPHAGEAL REFLUX DISEASE, UNSPECIFIED WHETHER ESOPHAGITIS PRESENT: ICD-10-CM

## 2024-04-03 RX ORDER — FAMOTIDINE 40 MG/1
TABLET, FILM COATED ORAL
Qty: 30 TABLET | Refills: 5 | Status: SHIPPED | OUTPATIENT
Start: 2024-04-03

## 2024-05-28 ENCOUNTER — APPOINTMENT (OUTPATIENT)
Dept: LAB | Facility: MEDICAL CENTER | Age: 59
End: 2024-05-28

## 2024-05-30 ENCOUNTER — APPOINTMENT (OUTPATIENT)
Dept: LAB | Facility: CLINIC | Age: 59
End: 2024-05-30
Payer: COMMERCIAL

## 2024-05-30 DIAGNOSIS — A04.8 H. PYLORI INFECTION: ICD-10-CM

## 2024-05-30 PROCEDURE — 87338 HPYLORI STOOL AG IA: CPT

## 2024-05-31 LAB — H PYLORI AG STL QL IA: NEGATIVE

## 2024-06-05 ENCOUNTER — OFFICE VISIT (OUTPATIENT)
Age: 59
End: 2024-06-05
Payer: COMMERCIAL

## 2024-06-05 VITALS
HEIGHT: 65 IN | RESPIRATION RATE: 18 BRPM | BODY MASS INDEX: 33.95 KG/M2 | HEART RATE: 78 BPM | TEMPERATURE: 97.6 F | OXYGEN SATURATION: 91 % | SYSTOLIC BLOOD PRESSURE: 140 MMHG | WEIGHT: 203.8 LBS | DIASTOLIC BLOOD PRESSURE: 78 MMHG

## 2024-06-05 DIAGNOSIS — Z86.010 HISTORY OF COLON POLYPS: ICD-10-CM

## 2024-06-05 DIAGNOSIS — R14.0 BLOATING: Primary | ICD-10-CM

## 2024-06-05 DIAGNOSIS — D12.6 TUBULAR ADENOMA OF COLON: ICD-10-CM

## 2024-06-05 DIAGNOSIS — R16.0 HEPATOMEGALY: ICD-10-CM

## 2024-06-05 DIAGNOSIS — R74.8 ELEVATED LIVER ENZYMES: ICD-10-CM

## 2024-06-05 DIAGNOSIS — K76.0 STEATOSIS OF LIVER: ICD-10-CM

## 2024-06-05 DIAGNOSIS — K21.9 GASTROESOPHAGEAL REFLUX DISEASE WITHOUT ESOPHAGITIS: ICD-10-CM

## 2024-06-05 PROCEDURE — 99214 OFFICE O/P EST MOD 30 MIN: CPT | Performed by: NURSE PRACTITIONER

## 2024-06-05 RX ORDER — FAMOTIDINE 20 MG/1
TABLET, FILM COATED ORAL
Qty: 30 TABLET | Refills: 5 | Status: SHIPPED | OUTPATIENT
Start: 2024-06-05

## 2024-06-05 NOTE — PROGRESS NOTES
[facility] - Outpatient Follow-up Note  Darwin Arellano 59 y.o. male MRN: [id number]  Encounter: [id number]          ASSESSMENT AND PLAN:    The patient presents today for follow-up office visit regarding his chronic bloating, GERD symptoms, elevated liver enzymes, hepatomegaly, and steatosis of the liver.      Exam: Abdomen is soft, nondistended, nontender, with hypoactive bowel sounds x 4. No edema noted of the b/l lower extremities upon exam today. Skin is non-icteric.      1. Bloating  2. Gastroesophageal reflux disease without esophagitis  Assessment & Plan:  Improved/Stable    Decrease famotidine to 20 mg at bedtime for the next 6 months and then can use it as needed or discontinue it.  Contact our office if you feel you would like to continue the famotidine or needed prescribed as needed.  Continue to avoid trigger foods is much as possible.    Orders:  -     famotidine (PEPCID) 20 mg tablet; TAKE 1 TABLET BY MOUTH EVERYDAY AT BEDTIME  3. Tubular adenoma of colon  4. History of colon polyps  Assessment & Plan:  We will hold off on any repeat colonoscopies until the recommended surveillance date unless the patient would start to develop red flag symptoms in the future.  The patient was agreeable and verbalized an understanding.  DUE: 3/5/31    Reviewed GI red flag symptoms with patient today.    5. Elevated liver enzymes  6. Hepatomegaly  7. Steatosis of liver  Assessment & Plan:  Discussed recommendations in regards to fatty liver including:   Strict control of contributing comorbidities (obesity, prediabetes/diabetes, hypertension, and hypertriglyceridemia).  Weight loss of approx 10-15% of patient's current body weight over a period of 6-12 months through low fat diet and cardiovascular exercise as tolerated.   Limiting alcohol consumption, preferably complete abstinence.  Monitor hepatic function every 6 months with routine labs.     Orders:  -     CBC and  differential; Future  -     Comprehensive metabolic panel; Future  -     Protime-INR; Future  -     Bilirubin, direct; Future    The patient will schedule a follow up office visit in 1 year, but understands to call or contact our office if there are any issues or concerns in the mean time.  ______________________________________________________________________    SUBJECTIVE: Patient is a 59 y.o. male who was previously seen in our office on 1/5/24 and presents today for follow-up office visit regarding his chronic bloating, GERD symptoms, elevated liver enzymes, hepatomegaly, and steatosis of the liver.  Since his last office visit the patient is status post both an EGD and colonoscopy on March 5, 2024 with Dr. Reyes with the EGD showing mild gastritis and the pathology coming back positive for H. pylori.  The patient did proceed with the quadruple therapy as recommended and the H. pylori stool antigen test came back negative.  The patient's colonoscopy showed 1 tubular adenoma that was removed with a recommendation of a repeat colonoscopy in 7 years.    The patient reports that since he has completed the quadruple therapy and had started the famotidine there is moderate to significant overall improvement in his symptoms.  The patient reports that the bloating is much better and that the GERD and the reflux symptoms have pretty much subsided.    The patient denies any reflux, nausea, dysphagia, vomiting, decreased appetite, unplanned weight loss, or abdominal pain.     The patient reports that they have a BM daily and reports that it is relieving, without any consistent diarrhea, nocturnal BMs, constipation, straining, melena or bloody stools. Last BM: This morning. Flatus: Yes, normal.    Meds: Famotidine 40 mg daily.  Daily NSAID Use: Denied      Imaging: (None):     Endoscopy History: EGD: (3/5/24): Mild gastritis. Path: Positive H. Pylori.    COLONOSCOPY: (3/5/24): 1 tubular adenoma removed with a recommendation  "of a repeat colonoscopy in 7 years.    DUE: 3/5/31    REVIEW OF SYSTEMS IS OTHERWISE NEGATIVE.      Historical Information   Past Medical History:   Diagnosis Date    GERD (gastroesophageal reflux disease)     Hyperlipidemia      Past Surgical History:   Procedure Laterality Date    NO PAST SURGERIES       Social History   Social History     Substance and Sexual Activity   Alcohol Use Yes    Comment: rare     Social History     Substance and Sexual Activity   Drug Use No     Social History     Tobacco Use   Smoking Status Former    Current packs/day: 0.00    Types: Cigarettes    Start date:     Quit date:     Years since quittin.4   Smokeless Tobacco Never     Family History   Problem Relation Age of Onset    Hypertension Mother     Hyperlipidemia Family        Meds/Allergies       Current Outpatient Medications:     famotidine (PEPCID) 40 MG tablet    simvastatin (ZOCOR) 20 mg tablet    Allergies   Allergen Reactions    Aspirin GI Intolerance    Penicillins Other (See Comments)     As a child, unable to remember rxn    Pollen Extract Allergic Rhinitis           Objective     Blood pressure 140/78, pulse 78, temperature 97.6 °F (36.4 °C), temperature source Tympanic, resp. rate 18, height 5' 5\" (1.651 m), weight 92.4 kg (203 lb 12.8 oz), SpO2 91%. Body mass index is 33.91 kg/m².      PHYSICAL EXAM:      General Appearance:   Alert, cooperative, no distress   HEENT:   Normocephalic, atraumatic, anicteric.     Neck:  Supple, symmetrical, trachea midline   Lungs:   Clear to auscultation bilaterally; no rales, rhonchi or wheezing; respirations unlabored    Heart::   Regular rate and rhythm; no murmur, rub, or gallop.   Abdomen:   Soft, non-tender, non-distended; normal bowel sounds; no masses, no organomegaly    Genitalia:   Deferred    Rectal:   Deferred    Extremities:  No cyanosis, clubbing or edema    Pulses:  2+ and symmetric    Skin:  No jaundice, rashes, or lesions    Lymph nodes:  No palpable " cervical lymphadenopathy        Lab Results:   No visits with results within 1 Day(s) from this visit.   Latest known visit with results is:   Appointment on 05/30/2024   Component Date Value    H pylori Ag, Stl 05/30/2024 Negative          Radiology Results:   No results found.

## 2024-06-05 NOTE — PATIENT INSTRUCTIONS
Continue famotidine 20 mg at bed time.   Continue to avoid trigger foods.   Proceed with repeat blood work in July.   Continue red flag symptoms.   Schedule a f/u OV in 1 year.     We did review GI red flag symptoms, including, but not limited to: chronic nausea, vomiting, diarrhea, chills, fever, and unintentional weight loss and should call or contact our office with any changes or concerns. I reviewed with the patient that if they notice any blood while vomiting or in their stool they should contact or office or go to the nearest emergency room for immediate evaluation. The patient was agreeable and verbalized an understanding.

## 2024-06-06 PROBLEM — D12.6 TUBULAR ADENOMA OF COLON: Status: ACTIVE | Noted: 2024-06-06

## 2024-06-06 PROBLEM — Z86.0100 HISTORY OF COLON POLYPS: Status: ACTIVE | Noted: 2024-06-06

## 2024-06-06 PROBLEM — Z86.010 HISTORY OF COLON POLYPS: Status: ACTIVE | Noted: 2024-06-06

## 2024-06-06 NOTE — ASSESSMENT & PLAN NOTE
We will hold off on any repeat colonoscopies until the recommended surveillance date unless the patient would start to develop red flag symptoms in the future.  The patient was agreeable and verbalized an understanding.  DUE: 3/5/31    Reviewed GI red flag symptoms with patient today.

## 2024-06-06 NOTE — ASSESSMENT & PLAN NOTE
Discussed recommendations in regards to fatty liver including:   Strict control of contributing comorbidities (obesity, prediabetes/diabetes, hypertension, and hypertriglyceridemia).  Weight loss of approx 10-15% of patient's current body weight over a period of 6-12 months through low fat diet and cardiovascular exercise as tolerated.   Limiting alcohol consumption, preferably complete abstinence.  Monitor hepatic function every 6 months with routine labs.

## 2024-06-06 NOTE — ASSESSMENT & PLAN NOTE
Improved/Stable    Decrease famotidine to 20 mg at bedtime for the next 6 months and then can use it as needed or discontinue it.  Contact our office if you feel you would like to continue the famotidine or needed prescribed as needed.  Continue to avoid trigger foods is much as possible.

## 2024-06-29 DIAGNOSIS — K21.9 GASTROESOPHAGEAL REFLUX DISEASE WITHOUT ESOPHAGITIS: ICD-10-CM

## 2024-07-01 RX ORDER — FAMOTIDINE 20 MG/1
TABLET, FILM COATED ORAL
Qty: 90 TABLET | Refills: 1 | Status: SHIPPED | OUTPATIENT
Start: 2024-07-01

## 2024-08-22 DIAGNOSIS — E78.5 HYPERLIPIDEMIA, UNSPECIFIED HYPERLIPIDEMIA TYPE: ICD-10-CM

## 2024-08-22 RX ORDER — SIMVASTATIN 20 MG
20 TABLET ORAL DAILY
Qty: 90 TABLET | Refills: 1 | Status: SHIPPED | OUTPATIENT
Start: 2024-08-22

## 2025-01-23 ENCOUNTER — OFFICE VISIT (OUTPATIENT)
Dept: URGENT CARE | Facility: CLINIC | Age: 60
End: 2025-01-23
Payer: COMMERCIAL

## 2025-01-23 VITALS
OXYGEN SATURATION: 96 % | HEIGHT: 65 IN | SYSTOLIC BLOOD PRESSURE: 133 MMHG | WEIGHT: 200 LBS | DIASTOLIC BLOOD PRESSURE: 93 MMHG | BODY MASS INDEX: 33.32 KG/M2 | HEART RATE: 81 BPM | RESPIRATION RATE: 18 BRPM | TEMPERATURE: 99 F

## 2025-01-23 DIAGNOSIS — J02.9 ACUTE PHARYNGITIS, UNSPECIFIED ETIOLOGY: Primary | ICD-10-CM

## 2025-01-23 DIAGNOSIS — R05.1 ACUTE COUGH: ICD-10-CM

## 2025-01-23 LAB — S PYO AG THROAT QL: NEGATIVE

## 2025-01-23 PROCEDURE — S9088 SERVICES PROVIDED IN URGENT: HCPCS

## 2025-01-23 PROCEDURE — 99203 OFFICE O/P NEW LOW 30 MIN: CPT

## 2025-01-23 PROCEDURE — 87880 STREP A ASSAY W/OPTIC: CPT

## 2025-01-23 PROCEDURE — 87070 CULTURE OTHR SPECIMN AEROBIC: CPT

## 2025-01-23 RX ORDER — BROMPHENIRAMINE MALEATE, PSEUDOEPHEDRINE HYDROCHLORIDE, AND DEXTROMETHORPHAN HYDROBROMIDE 2; 30; 10 MG/5ML; MG/5ML; MG/5ML
10 SYRUP ORAL 3 TIMES DAILY PRN
Qty: 120 ML | Refills: 0 | Status: SHIPPED | OUTPATIENT
Start: 2025-01-23 | End: 2025-01-30

## 2025-01-23 NOTE — PROGRESS NOTES
[facility]        NAME: Darwin Arellano is a 59 y.o. male  : 1965    MRN: [id number]  DATE: 2025  TIME: 10:52 AM    Assessment and Plan   Acute pharyngitis, unspecified etiology [J02.9]  1. Acute pharyngitis, unspecified etiology  POCT rapid ANTIGEN strepA    brompheniramine-pseudoephedrine-DM 30-2-10 MG/5ML syrup    Throat culture      2. Acute cough  brompheniramine-pseudoephedrine-DM 30-2-10 MG/5ML syrup        Poct strep negative     Patient Instructions     Take Bromfed DM  as prescribed    Take over the counter Mucinex during the day    Fluids and rest (Warm water with honey and lemon)  Tylenol/Ibuprofen for pain fever    Fluids and rest  Salt water gargles and chloraseptic spray  Throat Coat Tea  Wash hands frequently  Don't share drinks  Tylenol/Ibuprofen for pain/fever      Follow up with PCP in 3-5 days.  Proceed to  ER if symptoms worsen.    If tests are performed, our office will contact you with results only if changes need to made to the care plan discussed with you at the visit. You can review your full results on [facility].    Chief Complaint     Chief Complaint   Patient presents with    Sore Throat    Headache    Cough     Burning feeling in his chest and dry cough     Generalized Body Aches     Symptoms started 2 days ago     Fatigue         History of Present Illness       Patient presents with a 2 day histoyr of cough, generalized body aches, sore throat and fatigue.         Review of Systems   Review of Systems   Constitutional:  Positive for fatigue and fever.   Respiratory:  Positive for cough.    All other systems reviewed and are negative.        Current Medications       Current Outpatient Medications:     brompheniramine-pseudoephedrine-DM 30-2-10 MG/5ML syrup, Take 10 mL by mouth 3 (three) times a day as needed for cough or congestion for up to 7 days, Disp: 120 mL, Rfl: 0    simvastatin (ZOCOR) 20 mg tablet, TAKE 1 TABLET BY MOUTH EVERY DAY,  "Disp: 90 tablet, Rfl: 1    famotidine (PEPCID) 20 mg tablet, TAKE 1 TABLET BY MOUTH EVERYDAY AT BEDTIME (Patient not taking: Reported on 1/23/2025), Disp: 90 tablet, Rfl: 1    Current Allergies     Allergies as of 01/23/2025 - Reviewed 01/23/2025   Allergen Reaction Noted    Aspirin GI Intolerance 12/17/2012    Penicillins Other (See Comments) 12/17/2012    Pollen extract Allergic Rhinitis 04/15/2022            The following portions of the patient's history were reviewed and updated as appropriate: allergies, current medications, past family history, past medical history, past social history, past surgical history and problem list.     Past Medical History:   Diagnosis Date    GERD (gastroesophageal reflux disease)     Hyperlipidemia        Past Surgical History:   Procedure Laterality Date    NO PAST SURGERIES         Family History   Problem Relation Age of Onset    Hypertension Mother     Hyperlipidemia Family          Medications have been verified.        Objective   /93 (BP Location: Right arm, Patient Position: Sitting, Cuff Size: Adult)   Pulse 81   Temp 99 °F (37.2 °C) (Temporal)   Resp 18   Ht 5' 5\" (1.651 m)   Wt 90.7 kg (200 lb)   SpO2 96%   BMI 33.28 kg/m²        Physical Exam     Physical Exam  Vitals and nursing note reviewed.   Constitutional:       General: He is not in acute distress.     Appearance: Normal appearance.   HENT:      Head: Normocephalic.      Right Ear: Tympanic membrane, ear canal and external ear normal.      Left Ear: Tympanic membrane, ear canal and external ear normal.      Nose: Congestion and rhinorrhea present.      Mouth/Throat:      Mouth: Mucous membranes are moist.      Pharynx: Posterior oropharyngeal erythema present. No oropharyngeal exudate.      Tonsils: No tonsillar exudate or tonsillar abscesses. 0 on the right. 0 on the left.   Eyes:      Conjunctiva/sclera: Conjunctivae normal.      Pupils: Pupils are equal, round, and reactive to light. "   Cardiovascular:      Rate and Rhythm: Normal rate and regular rhythm.      Pulses: Normal pulses.      Heart sounds: Normal heart sounds. No murmur heard.  Pulmonary:      Effort: Pulmonary effort is normal.      Breath sounds: Normal breath sounds. No wheezing or rhonchi.   Abdominal:      General: Abdomen is flat. Bowel sounds are normal.      Palpations: Abdomen is soft.      Tenderness: There is no abdominal tenderness.   Musculoskeletal:         General: Normal range of motion.      Cervical back: Normal range of motion.   Lymphadenopathy:      Cervical: No cervical adenopathy.   Skin:     General: Skin is warm and dry.   Neurological:      General: No focal deficit present.      Mental Status: He is alert and oriented to person, place, and time.

## 2025-01-23 NOTE — PROGRESS NOTES
[facility]        NAME: Darwin Arellano is a 59 y.o. male  : 1965    MRN: [id number]  DATE: 2025  TIME: 10:53 AM    Assessment and Plan   Acute pharyngitis, unspecified etiology [J02.9]  1. Acute pharyngitis, unspecified etiology  POCT rapid ANTIGEN strepA    brompheniramine-pseudoephedrine-DM 30-2-10 MG/5ML syrup    Throat culture      2. Acute cough  brompheniramine-pseudoephedrine-DM 30-2-10 MG/5ML syrup            Patient Instructions       Follow up with PCP in 3-5 days.  Proceed to  ER if symptoms worsen.    If tests are performed, our office will contact you with results only if changes need to made to the care plan discussed with you at the visit. [facility]    Chief Complaint     Chief Complaint   Patient presents with    Sore Throat    Headache    Cough     Burning feeling in his chest and dry cough     Generalized Body Aches     Symptoms started 2 days ago     Fatigue         History of Present Illness       Patient presents with sore throat, cough, body aches and fatigue for 2 days.     Sore Throat   Associated symptoms include coughing and headaches.   Headache  Cough  Associated symptoms include headaches and a sore throat.   Generalized Body Aches  Associated symptoms include headaches, a sore throat, fatigue and coughing.   Fatigue  Associated symptoms include coughing, fatigue, headaches and a sore throat.       Review of Systems   Review of Systems   Constitutional:  Positive for fatigue.   HENT:  Positive for sore throat.    Respiratory:  Positive for cough.    Neurological:  Positive for headaches.         Current Medications       Current Outpatient Medications:     brompheniramine-pseudoephedrine-DM 30-2-10 MG/5ML syrup, Take 10 mL by mouth 3 (three) times a day as needed for cough or congestion for up to 7 days, Disp: 120 mL, Rfl: 0    simvastatin (ZOCOR) 20 mg tablet, TAKE 1 TABLET BY MOUTH EVERY DAY, Disp: 90  "tablet, Rfl: 1    famotidine (PEPCID) 20 mg tablet, TAKE 1 TABLET BY MOUTH EVERYDAY AT BEDTIME (Patient not taking: Reported on 1/23/2025), Disp: 90 tablet, Rfl: 1    Current Allergies     Allergies as of 01/23/2025 - Reviewed 01/23/2025   Allergen Reaction Noted    Aspirin GI Intolerance 12/17/2012    Penicillins Other (See Comments) 12/17/2012    Pollen extract Allergic Rhinitis 04/15/2022            The following portions of the patient's history were reviewed and updated as appropriate: allergies, current medications, past family history, past medical history, past social history, past surgical history and problem list.     Past Medical History:   Diagnosis Date    GERD (gastroesophageal reflux disease)     Hyperlipidemia        Past Surgical History:   Procedure Laterality Date    NO PAST SURGERIES         Family History   Problem Relation Age of Onset    Hypertension Mother     Hyperlipidemia Family          Medications have been verified.        Objective   /93 (BP Location: Right arm, Patient Position: Sitting, Cuff Size: Adult)   Pulse 81   Temp 99 °F (37.2 °C) (Temporal)   Resp 18   Ht 5' 5\" (1.651 m)   Wt 90.7 kg (200 lb)   SpO2 96%   BMI 33.28 kg/m²        Physical Exam     Physical Exam              "

## 2025-01-23 NOTE — PATIENT INSTRUCTIONS
Take Bromfed DM  as prescribed    Take over the counter Mucinex during the day    Fluids and rest (Warm water with honey and lemon)  Tylenol/Ibuprofen for pain fever    Fluids and rest  Salt water gargles and chloraseptic spray  Throat Coat Tea  Wash hands frequently  Don't share drinks  Tylenol/Ibuprofen for pain/fever      Follow up with PCP in 3-5 days.  Proceed to  ER if symptoms worsen.    If tests are performed, our office will contact you with results only if changes need to made to the care plan discussed with you at the visit. You can review your full results on [facility].   Attending Only

## 2025-01-25 ENCOUNTER — RESULTS FOLLOW-UP (OUTPATIENT)
Dept: URGENT CARE | Facility: CLINIC | Age: 60
End: 2025-01-25

## 2025-01-25 LAB — BACTERIA THROAT CULT: NORMAL

## 2025-04-05 DIAGNOSIS — E78.5 HYPERLIPIDEMIA, UNSPECIFIED HYPERLIPIDEMIA TYPE: ICD-10-CM

## 2025-04-07 RX ORDER — SIMVASTATIN 20 MG
20 TABLET ORAL DAILY
Qty: 90 TABLET | Refills: 1 | OUTPATIENT
Start: 2025-04-07

## 2025-04-07 NOTE — TELEPHONE ENCOUNTER
Pt aware, I put him on for the 15th for a 30 minute slot. Not sure if you needed the full 30 minutes. I can make it a 15 minute if you want.

## 2025-04-15 ENCOUNTER — OFFICE VISIT (OUTPATIENT)
Dept: FAMILY MEDICINE CLINIC | Facility: CLINIC | Age: 60
End: 2025-04-15
Payer: COMMERCIAL

## 2025-04-15 VITALS
TEMPERATURE: 97.4 F | DIASTOLIC BLOOD PRESSURE: 82 MMHG | HEART RATE: 74 BPM | HEIGHT: 65 IN | SYSTOLIC BLOOD PRESSURE: 130 MMHG | BODY MASS INDEX: 33.66 KG/M2 | OXYGEN SATURATION: 98 % | WEIGHT: 202 LBS

## 2025-04-15 DIAGNOSIS — Z00.00 ANNUAL PHYSICAL EXAM: Primary | ICD-10-CM

## 2025-04-15 DIAGNOSIS — E78.5 HYPERLIPIDEMIA, UNSPECIFIED HYPERLIPIDEMIA TYPE: ICD-10-CM

## 2025-04-15 DIAGNOSIS — Z13.89 SCREENING FOR MULTIPLE CONDITIONS: ICD-10-CM

## 2025-04-15 PROCEDURE — 99213 OFFICE O/P EST LOW 20 MIN: CPT | Performed by: PHYSICIAN ASSISTANT

## 2025-04-15 PROCEDURE — 99396 PREV VISIT EST AGE 40-64: CPT | Performed by: PHYSICIAN ASSISTANT

## 2025-04-15 RX ORDER — SIMVASTATIN 20 MG
20 TABLET ORAL DAILY
Qty: 90 TABLET | Refills: 1 | Status: SHIPPED | OUTPATIENT
Start: 2025-04-15

## 2025-04-15 NOTE — PATIENT INSTRUCTIONS
"Patient Education     Routine physical for adults   The Basics   Written by the doctors and editors at [facility]   What is a physical? -- A physical is a routine visit, or \"check-up,\" with your doctor. You might also hear it called a \"wellness visit\" or \"preventive visit.\"  During each visit, the doctor will:   Ask about your physical and mental health   Ask about your habits, behaviors, and lifestyle   Do an exam   Give you vaccines if needed   Talk to you about any medicines you take   Give advice about your health   Answer your questions  Getting regular check-ups is an important part of taking care of your health. It can help your doctor find and treat any problems you have. But it's also important for preventing health problems.  A routine physical is different from a \"sick visit.\" A sick visit is when you see a doctor because of a health concern or problem. Since physicals are scheduled ahead of time, you can think about what you want to ask the doctor.  How often should I get a physical? -- It depends on your age and health. In general, for people age 21 years and older:   If you are younger than 50 years, you might be able to get a physical every 3 years.   If you are 50 years or older, your doctor might recommend a physical every year.  If you have an ongoing health condition, like diabetes or high blood pressure, your doctor will probably want to see you more often.  What happens during a physical? -- In general, each visit will include:   Physical exam - The doctor or nurse will check your height, weight, heart rate, and blood pressure. They will also look at your eyes and ears. They will ask about how you are feeling and whether you have any symptoms that bother you.   Medicines - It's a good idea to bring a list of all the medicines you take to each doctor visit. Your doctor will talk to you about your medicines and answer any questions. Tell them if you are having any side effects that bother you. You " "should also tell them if you are having trouble paying for any of your medicines.   Habits and behaviors - This includes:   Your diet   Your exercise habits   Whether you smoke, drink alcohol, or use drugs   Whether you are sexually active   Whether you feel safe at home  Your doctor will talk to you about things you can do to improve your health and lower your risk of health problems. They will also offer help and support. For example, if you want to quit smoking, they can give you advice and might prescribe medicines. If you want to improve your diet or get more physical activity, they can help you with this, too.   Lab tests, if needed - The tests you get will depend on your age and situation. For example, your doctor might want to check your:   Cholesterol   Blood sugar   Iron level   Vaccines - The recommended vaccines will depend on your age, health, and what vaccines you already had. Vaccines are very important because they can prevent certain serious or deadly infections.   Discussion of screening - \"Screening\" means checking for diseases or other health problems before they cause symptoms. Your doctor can recommend screening based on your age, risk, and preferences. This might include tests to check for:   Cancer, such as breast, prostate, cervical, ovarian, colorectal, prostate, lung, or skin cancer   Sexually transmitted infections, such as chlamydia and gonorrhea   Mental health conditions like depression and anxiety  Your doctor will talk to you about the different types of screening tests. They can help you decide which screenings to have. They can also explain what the results might mean.   Answering questions - The physical is a good time to ask the doctor or nurse questions about your health. If needed, they can refer you to other doctors or specialists, too.  Adults older than 65 years often need other care, too. As you get older, your doctor will talk to you about:   How to prevent falling at " home   Hearing or vision tests   Memory testing   How to take your medicines safely   Making sure that you have the help and support you need at home  All topics are updated as new evidence becomes available and our peer review process is complete.  This topic retrieved from [employer] on: May 02, 2024.  Topic 967032 Version 1.0  Release: 32.4.3 - C32.122  © 2024 UpToDate, Inc. and/or its affiliates. All rights reserved.  Consumer Information Use and Disclaimer   Disclaimer: This generalized information is a limited summary of diagnosis, treatment, and/or medication information. It is not meant to be comprehensive and should be used as a tool to help the user understand and/or assess potential diagnostic and treatment options. It does NOT include all information about conditions, treatments, medications, side effects, or risks that may apply to a specific patient. It is not intended to be medical advice or a substitute for the medical advice, diagnosis, or treatment of a health care provider based on the health care provider's examination and assessment of a patient's specific and unique circumstances. Patients must speak with a health care provider for complete information about their health, medical questions, and treatment options, including any risks or benefits regarding use of medications. This information does not endorse any treatments or medications as safe, effective, or approved for treating a specific patient. UpToDate, Inc. and its affiliates disclaim any warranty or liability relating to this information or the use thereof.The use of this information is governed by the Terms of Use, available at https://www.woltersPlinkuwer.com/en/know/clinical-effectiveness-terms. 2024© UpToDate, Inc. and its affiliates and/or licensors. All rights reserved.  Copyright   © 2024 UpToDate, Inc. and/or its affiliates. All rights reserved.

## 2025-04-15 NOTE — PROGRESS NOTES
Adult Annual Physical  Name: Darwin Arellano      : 1965      MRN: [id number]  Encounter Provider: Lucy Childs PA-C  Encounter Date: 4/15/2025   Encounter department: [facility]    :  Assessment & Plan  Annual physical exam         Hyperlipidemia, unspecified hyperlipidemia type  Due for routine labs. Refill simvastatin.  Orders:  •  simvastatin (ZOCOR) 20 mg tablet; Take 1 tablet (20 mg total) by mouth daily    Screening for multiple conditions    Orders:  •  CBC; Future  •  Comprehensive metabolic panel; Future  •  Lipid Panel with Direct LDL reflex; Future  •  PSA, Total Screen; Future    Annual physical exam         Hyperlipidemia, unspecified hyperlipidemia type         Screening for multiple conditions           Hyperlipidemia, unspecified hyperlipidemia type         Annual physical exam               Preventive Screenings:  - Diabetes Screening: risks/benefits discussed and orders placed  - Cholesterol Screening: orders placed and risks/benefits discussed   - Colon cancer screening: screening up-to-date   - Lung cancer screening: screening not indicated   - Prostate cancer screening: risks/benefits discussed and orders placed     Immunizations:  - Immunizations due: Influenza, Tdap and Zoster (Shingrix)      Depression Screening and Follow-up Plan: Patient was screened for depression during today's encounter. They screened negative with a PHQ-2 score of 0.          History of Present Illness     Adult Annual Physical:  Patient presents for annual physical. Richard is here today for routine visit, no complaints at this time..     Depression Screening:  - PHQ-2 Score: 0    Review of Systems   Constitutional:  Negative for chills and fever.   HENT:  Negative for ear pain and sore throat.    Eyes:  Negative for pain and visual disturbance.   Respiratory:  Negative for cough and shortness of breath.    Cardiovascular:  Negative for chest pain and palpitations.   Gastrointestinal:  Negative  "for abdominal pain and vomiting.   Genitourinary:  Negative for dysuria and hematuria.   Musculoskeletal:  Negative for arthralgias and back pain.   Skin:  Negative for color change and rash.   Neurological:  Negative for seizures and syncope.   All other systems reviewed and are negative.        Objective   /82 (BP Location: Left arm, Patient Position: Sitting, Cuff Size: Adult)   Pulse 74   Temp (!) 97.4 °F (36.3 °C) (Temporal)   Ht 5' 5\" (1.651 m)   Wt 91.6 kg (202 lb)   SpO2 98%   BMI 33.61 kg/m²     Physical Exam  Vitals reviewed.   Constitutional:       General: He is not in acute distress.     Appearance: He is well-developed. He is not diaphoretic.   HENT:      Head: Normocephalic and atraumatic.      Right Ear: Hearing, tympanic membrane, ear canal and external ear normal.      Left Ear: Hearing, tympanic membrane, ear canal and external ear normal.      Nose: Nose normal.      Mouth/Throat:      Mouth: Mucous membranes are moist.      Pharynx: Oropharynx is clear. Uvula midline. No oropharyngeal exudate.   Eyes:      General: No scleral icterus.        Right eye: No discharge.         Left eye: No discharge.      Conjunctiva/sclera: Conjunctivae normal.   Neck:      Thyroid: No thyromegaly.      Vascular: No carotid bruit.   Cardiovascular:      Rate and Rhythm: Normal rate and regular rhythm.      Heart sounds: Normal heart sounds. No murmur heard.  Pulmonary:      Effort: Pulmonary effort is normal. No respiratory distress.      Breath sounds: Normal breath sounds. No wheezing.   Abdominal:      General: Bowel sounds are normal. There is no distension.      Palpations: Abdomen is soft. There is no mass.      Tenderness: There is no abdominal tenderness. There is no guarding or rebound.   Musculoskeletal:         General: No tenderness. Normal range of motion.      Cervical back: Neck supple.   Lymphadenopathy:      Cervical: No cervical adenopathy.   Skin:     General: Skin is warm and dry. "      Findings: No erythema or rash.   Neurological:      Mental Status: He is alert and oriented to person, place, and time.   Psychiatric:         Behavior: Behavior normal.         Thought Content: Thought content normal.         Judgment: Judgment normal.

## 2025-04-15 NOTE — ASSESSMENT & PLAN NOTE
Due for routine labs. Refill simvastatin.  Orders:  •  simvastatin (ZOCOR) 20 mg tablet; Take 1 tablet (20 mg total) by mouth daily

## 2025-05-06 ENCOUNTER — APPOINTMENT (OUTPATIENT)
Dept: LAB | Facility: CLINIC | Age: 60
End: 2025-05-06
Attending: NURSE PRACTITIONER
Payer: COMMERCIAL

## 2025-05-06 DIAGNOSIS — Z13.89 SCREENING FOR MULTIPLE CONDITIONS: ICD-10-CM

## 2025-05-06 DIAGNOSIS — K76.0 STEATOSIS OF LIVER: ICD-10-CM

## 2025-05-06 LAB
ALBUMIN SERPL BCG-MCNC: 4.4 G/DL (ref 3.5–5)
ALP SERPL-CCNC: 62 U/L (ref 34–104)
ALT SERPL W P-5'-P-CCNC: 63 U/L (ref 7–52)
ANION GAP SERPL CALCULATED.3IONS-SCNC: 8 MMOL/L (ref 4–13)
AST SERPL W P-5'-P-CCNC: 40 U/L (ref 13–39)
BASOPHILS # BLD AUTO: 0.05 THOUSANDS/ÂΜL (ref 0–0.1)
BASOPHILS NFR BLD AUTO: 1 % (ref 0–1)
BILIRUB DIRECT SERPL-MCNC: 0.13 MG/DL (ref 0–0.2)
BILIRUB SERPL-MCNC: 0.63 MG/DL (ref 0.2–1)
BUN SERPL-MCNC: 14 MG/DL (ref 5–25)
CALCIUM SERPL-MCNC: 9.4 MG/DL (ref 8.4–10.2)
CHLORIDE SERPL-SCNC: 107 MMOL/L (ref 96–108)
CHOLEST SERPL-MCNC: 163 MG/DL (ref ?–200)
CO2 SERPL-SCNC: 25 MMOL/L (ref 21–32)
CREAT SERPL-MCNC: 0.82 MG/DL (ref 0.6–1.3)
EOSINOPHIL # BLD AUTO: 0.42 THOUSAND/ÂΜL (ref 0–0.61)
EOSINOPHIL NFR BLD AUTO: 8 % (ref 0–6)
ERYTHROCYTE [DISTWIDTH] IN BLOOD BY AUTOMATED COUNT: 13.8 % (ref 11.6–15.1)
GFR SERPL CREATININE-BSD FRML MDRD: 97 ML/MIN/1.73SQ M
GLUCOSE P FAST SERPL-MCNC: 97 MG/DL (ref 65–99)
HCT VFR BLD AUTO: 44.4 % (ref 36.5–49.3)
HDLC SERPL-MCNC: 65 MG/DL
HGB BLD-MCNC: 16 G/DL (ref 12–17)
IMM GRANULOCYTES # BLD AUTO: 0.01 THOUSAND/UL (ref 0–0.2)
IMM GRANULOCYTES NFR BLD AUTO: 0 % (ref 0–2)
INR PPP: 0.9 (ref 0.85–1.19)
LDLC SERPL CALC-MCNC: 87 MG/DL (ref 0–100)
LYMPHOCYTES # BLD AUTO: 2.16 THOUSANDS/ÂΜL (ref 0.6–4.47)
LYMPHOCYTES NFR BLD AUTO: 40 % (ref 14–44)
MCH RBC QN AUTO: 31.8 PG (ref 26.8–34.3)
MCHC RBC AUTO-ENTMCNC: 36 G/DL (ref 31.4–37.4)
MCV RBC AUTO: 88 FL (ref 82–98)
MONOCYTES # BLD AUTO: 0.43 THOUSAND/ÂΜL (ref 0.17–1.22)
MONOCYTES NFR BLD AUTO: 8 % (ref 4–12)
NEUTROPHILS # BLD AUTO: 2.3 THOUSANDS/ÂΜL (ref 1.85–7.62)
NEUTS SEG NFR BLD AUTO: 43 % (ref 43–75)
NRBC BLD AUTO-RTO: 0 /100 WBCS
PLATELET # BLD AUTO: 173 THOUSANDS/UL (ref 149–390)
PMV BLD AUTO: 10.6 FL (ref 8.9–12.7)
POTASSIUM SERPL-SCNC: 4.3 MMOL/L (ref 3.5–5.3)
PROT SERPL-MCNC: 7.1 G/DL (ref 6.4–8.4)
PROTHROMBIN TIME: 12.5 SECONDS (ref 12.3–15)
PSA SERPL-MCNC: 0.89 NG/ML (ref 0–4)
RBC # BLD AUTO: 5.03 MILLION/UL (ref 3.88–5.62)
SODIUM SERPL-SCNC: 140 MMOL/L (ref 135–147)
TRIGL SERPL-MCNC: 57 MG/DL (ref ?–150)
WBC # BLD AUTO: 5.37 THOUSAND/UL (ref 4.31–10.16)

## 2025-05-06 PROCEDURE — 36415 COLL VENOUS BLD VENIPUNCTURE: CPT

## 2025-05-06 PROCEDURE — 80061 LIPID PANEL: CPT

## 2025-05-06 PROCEDURE — 80053 COMPREHEN METABOLIC PANEL: CPT

## 2025-05-06 PROCEDURE — 85025 COMPLETE CBC W/AUTO DIFF WBC: CPT

## 2025-05-06 PROCEDURE — G0103 PSA SCREENING: HCPCS

## 2025-05-06 PROCEDURE — 85610 PROTHROMBIN TIME: CPT

## 2025-05-06 PROCEDURE — 82248 BILIRUBIN DIRECT: CPT

## 2025-05-07 ENCOUNTER — RESULTS FOLLOW-UP (OUTPATIENT)
Age: 60
End: 2025-05-07

## 2025-05-07 ENCOUNTER — TELEPHONE (OUTPATIENT)
Age: 60
End: 2025-05-07

## 2025-05-07 DIAGNOSIS — R74.01 TRANSAMINITIS: ICD-10-CM

## 2025-05-07 DIAGNOSIS — K76.0 STEATOSIS OF LIVER: ICD-10-CM

## 2025-05-07 DIAGNOSIS — R16.0 HEPATOMEGALY: Primary | ICD-10-CM

## 2025-05-07 NOTE — TELEPHONE ENCOUNTER
Can you please call the patient and schedule him for a follow-up office visit with me in [place] in the next 4 to 8 weeks?

## 2025-05-07 NOTE — TELEPHONE ENCOUNTER
Spoke to Richard and scheduled office visit on 7/02/25 at 2:30 in [place]. He asked about blood work results and I read him the my-chart message sent by Rene:    I just looked at your blood work from yesterday and it does appear that your AST and ALT, 2 of your 4 liver enzymes are elevated, slightly, but slightly more elevated than they were last year.     I would like you to proceed with an ultrasound of your liver to evaluate any other underlying changes or issues that could be causing the elevation. I put the order in and you will need to call central scheduling at: [phone] to get that scheduled. I will contact you once I have the results.      I will also have my staff reach out to get you scheduled for a 1 year follow up in June/July    He is calling to schedule ultra sound

## 2025-05-12 ENCOUNTER — HOSPITAL ENCOUNTER (OUTPATIENT)
Dept: ULTRASOUND IMAGING | Facility: HOSPITAL | Age: 60
Discharge: HOME | End: 2025-05-12
Attending: NURSE PRACTITIONER
Payer: COMMERCIAL

## 2025-05-12 ENCOUNTER — RESULTS FOLLOW-UP (OUTPATIENT)
Dept: FAMILY MEDICINE CLINIC | Facility: CLINIC | Age: 60
End: 2025-05-12

## 2025-05-12 DIAGNOSIS — R16.0 HEPATOMEGALY: ICD-10-CM

## 2025-05-12 DIAGNOSIS — K76.0 STEATOSIS OF LIVER: ICD-10-CM

## 2025-05-12 DIAGNOSIS — R74.01 TRANSAMINITIS: ICD-10-CM

## 2025-05-12 PROCEDURE — 76705 ECHO EXAM OF ABDOMEN: CPT

## 2025-05-17 ENCOUNTER — RESULTS FOLLOW-UP (OUTPATIENT)
Dept: GASTROENTEROLOGY | Facility: CLINIC | Age: 60
End: 2025-05-17

## 2025-05-17 DIAGNOSIS — R74.01 TRANSAMINITIS: Primary | ICD-10-CM

## 2025-05-17 DIAGNOSIS — R16.0 HEPATOMEGALY: ICD-10-CM

## 2025-05-17 DIAGNOSIS — K76.0 STEATOSIS OF LIVER: ICD-10-CM

## 2025-06-17 ENCOUNTER — APPOINTMENT (OUTPATIENT)
Dept: LAB | Facility: CLINIC | Age: 60
End: 2025-06-17
Attending: NURSE PRACTITIONER
Payer: COMMERCIAL

## 2025-06-17 DIAGNOSIS — K76.0 STEATOSIS OF LIVER: ICD-10-CM

## 2025-06-17 DIAGNOSIS — R16.0 HEPATOMEGALY: ICD-10-CM

## 2025-06-17 DIAGNOSIS — R74.01 TRANSAMINITIS: ICD-10-CM

## 2025-06-17 LAB
ALBUMIN SERPL BCG-MCNC: 4.5 G/DL (ref 3.5–5)
ALP SERPL-CCNC: 56 U/L (ref 34–104)
ALT SERPL W P-5'-P-CCNC: 40 U/L (ref 7–52)
ANION GAP SERPL CALCULATED.3IONS-SCNC: 9 MMOL/L (ref 4–13)
AST SERPL W P-5'-P-CCNC: 29 U/L (ref 13–39)
BASOPHILS # BLD AUTO: 0.04 THOUSANDS/ÂΜL (ref 0–0.1)
BASOPHILS NFR BLD AUTO: 1 % (ref 0–1)
BILIRUB DIRECT SERPL-MCNC: 0.17 MG/DL (ref 0–0.2)
BILIRUB SERPL-MCNC: 0.52 MG/DL (ref 0.2–1)
BUN SERPL-MCNC: 18 MG/DL (ref 5–25)
CALCIUM SERPL-MCNC: 9.4 MG/DL (ref 8.4–10.2)
CHLORIDE SERPL-SCNC: 105 MMOL/L (ref 96–108)
CO2 SERPL-SCNC: 24 MMOL/L (ref 21–32)
CREAT SERPL-MCNC: 0.85 MG/DL (ref 0.6–1.3)
EOSINOPHIL # BLD AUTO: 0.31 THOUSAND/ÂΜL (ref 0–0.61)
EOSINOPHIL NFR BLD AUTO: 7 % (ref 0–6)
ERYTHROCYTE [DISTWIDTH] IN BLOOD BY AUTOMATED COUNT: 13.4 % (ref 11.6–15.1)
FERRITIN SERPL-MCNC: 305 NG/ML (ref 30–336)
GFR SERPL CREATININE-BSD FRML MDRD: 96 ML/MIN/1.73SQ M
GLUCOSE P FAST SERPL-MCNC: 139 MG/DL (ref 65–99)
HCT VFR BLD AUTO: 44.8 % (ref 36.5–49.3)
HGB BLD-MCNC: 15.8 G/DL (ref 12–17)
IGA SERPL-MCNC: 225 MG/DL (ref 66–433)
IGG SERPL-MCNC: 1061 MG/DL (ref 635–1741)
IGM SERPL-MCNC: 61 MG/DL (ref 45–281)
IMM GRANULOCYTES # BLD AUTO: 0.01 THOUSAND/UL (ref 0–0.2)
IMM GRANULOCYTES NFR BLD AUTO: 0 % (ref 0–2)
INR PPP: 0.91 (ref 0.85–1.19)
IRON SATN MFR SERPL: 20 % (ref 15–50)
IRON SERPL-MCNC: 79 UG/DL (ref 50–212)
LYMPHOCYTES # BLD AUTO: 1.75 THOUSANDS/ÂΜL (ref 0.6–4.47)
LYMPHOCYTES NFR BLD AUTO: 40 % (ref 14–44)
MCH RBC QN AUTO: 31.3 PG (ref 26.8–34.3)
MCHC RBC AUTO-ENTMCNC: 35.3 G/DL (ref 31.4–37.4)
MCV RBC AUTO: 89 FL (ref 82–98)
MONOCYTES # BLD AUTO: 0.27 THOUSAND/ÂΜL (ref 0.17–1.22)
MONOCYTES NFR BLD AUTO: 6 % (ref 4–12)
NEUTROPHILS # BLD AUTO: 2.03 THOUSANDS/ÂΜL (ref 1.85–7.62)
NEUTS SEG NFR BLD AUTO: 46 % (ref 43–75)
NRBC BLD AUTO-RTO: 0 /100 WBCS
PLATELET # BLD AUTO: 156 THOUSANDS/UL (ref 149–390)
PMV BLD AUTO: 10.9 FL (ref 8.9–12.7)
POTASSIUM SERPL-SCNC: 4.2 MMOL/L (ref 3.5–5.3)
PROT SERPL-MCNC: 7.1 G/DL (ref 6.4–8.4)
PROTHROMBIN TIME: 12.6 SECONDS (ref 12.3–15)
RBC # BLD AUTO: 5.04 MILLION/UL (ref 3.88–5.62)
SODIUM SERPL-SCNC: 138 MMOL/L (ref 135–147)
TIBC SERPL-MCNC: 386.4 UG/DL (ref 250–450)
TRANSFERRIN SERPL-MCNC: 276 MG/DL (ref 203–362)
UIBC SERPL-MCNC: 307 UG/DL (ref 155–355)
WBC # BLD AUTO: 4.41 THOUSAND/UL (ref 4.31–10.16)

## 2025-06-17 PROCEDURE — 86803 HEPATITIS C AB TEST: CPT

## 2025-06-17 PROCEDURE — 85025 COMPLETE CBC W/AUTO DIFF WBC: CPT

## 2025-06-17 PROCEDURE — 86709 HEPATITIS A IGM ANTIBODY: CPT

## 2025-06-17 PROCEDURE — 83550 IRON BINDING TEST: CPT

## 2025-06-17 PROCEDURE — 83540 ASSAY OF IRON: CPT

## 2025-06-17 PROCEDURE — 82784 ASSAY IGA/IGD/IGG/IGM EACH: CPT

## 2025-06-17 PROCEDURE — 80053 COMPREHEN METABOLIC PANEL: CPT

## 2025-06-17 PROCEDURE — 82728 ASSAY OF FERRITIN: CPT

## 2025-06-17 PROCEDURE — 86704 HEP B CORE ANTIBODY TOTAL: CPT

## 2025-06-17 PROCEDURE — 82248 BILIRUBIN DIRECT: CPT

## 2025-06-17 PROCEDURE — 36415 COLL VENOUS BLD VENIPUNCTURE: CPT

## 2025-06-17 PROCEDURE — 86706 HEP B SURFACE ANTIBODY: CPT

## 2025-06-17 PROCEDURE — 85610 PROTHROMBIN TIME: CPT

## 2025-06-19 LAB
HAV IGM SER QL: NORMAL
HBV CORE AB SER QL: NORMAL
HBV SURFACE AB SER-ACNC: 5.25 MIU/ML
HCV AB SER QL: NORMAL

## 2025-06-23 ENCOUNTER — RESULTS FOLLOW-UP (OUTPATIENT)
Age: 60
End: 2025-06-23

## 2025-07-02 ENCOUNTER — OFFICE VISIT (OUTPATIENT)
Age: 60
End: 2025-07-02
Payer: COMMERCIAL

## 2025-07-02 VITALS
HEART RATE: 78 BPM | DIASTOLIC BLOOD PRESSURE: 68 MMHG | SYSTOLIC BLOOD PRESSURE: 114 MMHG | BODY MASS INDEX: 31.99 KG/M2 | OXYGEN SATURATION: 96 % | HEIGHT: 65 IN | TEMPERATURE: 97.9 F | WEIGHT: 192 LBS | RESPIRATION RATE: 18 BRPM

## 2025-07-02 DIAGNOSIS — Z86.0100 HISTORY OF COLON POLYPS: ICD-10-CM

## 2025-07-02 DIAGNOSIS — D12.6 TUBULAR ADENOMA OF COLON: ICD-10-CM

## 2025-07-02 DIAGNOSIS — K76.0 STEATOSIS OF LIVER: Primary | ICD-10-CM

## 2025-07-02 DIAGNOSIS — R16.0 HEPATOMEGALY: ICD-10-CM

## 2025-07-02 PROBLEM — R74.01 TRANSAMINITIS: Status: RESOLVED | Noted: 2025-05-07 | Resolved: 2025-07-02

## 2025-07-02 PROCEDURE — 99214 OFFICE O/P EST MOD 30 MIN: CPT | Performed by: NURSE PRACTITIONER

## 2025-07-02 NOTE — ASSESSMENT & PLAN NOTE
During today's office visit, I reviewed the patient's previous blood work results from 25 and since they were normal, we will proceed with repeat blood work in December.  We will contact the patient once we have the results of the blood work to review the results and discuss any other treatment plan recommendations.  The patient was agreeable and verbalized an understanding.    NAFLD Fibrosis Score is: -.806    NAFLD Score Correlated Fibrosis Severity   <-1.455 F0-F2   -1.455-0.676 Indeterminate Score   >0.676 F3-F4   **Fibrosis Severity Scale: F0 = no fibrosis; F1= mild fibrosis; F2 = moderate fibrosis; F3 = severe fibrosis; F4 = cirrhosis    NAFLD Score Component Values:  Component Value Date   Age: 60 y.o.     BMI: 31.95 kg/m²    IFG or DM: No    AST: 29 U/L 2025   ALT: 40 U/L 2025   Platelet: 156 Thousands/uL 2025   Albumin: 4.5 g/dL 2025        Fibrosis-4 (FIB-4) Score is: 1.7    Indication for testing Absence of advanced fibrosis Presence of advanced fibrosis Indeterminate result   NAFLD <1.30 >2.67 1.30-2.67   Hepatitis C <1.45 >3.25 1.45-3.25   Hepatitis B <1.00 >2.65 1.00-2.65     FIB-4 Score Component Values:  Component Value Date   Age: 60 y.o.     AST: 29 U/L 2025   Platelet: 156 Thousands/uL 2025   ALT: 40 U/L 2025        Last Liver Imagin2025 right upper quadrant ultrasound.  Repeat Imaging Ordered: No    Continue to work on diet/weight loss.  Continue to eat lower fat, higher fiber foods on a more regular daily basis.   Continue to keep alcohol consumption to a minimum.   Continue to work on work on adequate management of contributing health issues such as: high blood pressure, high cholesterol, diabetes, etc.     Orders:  •  CBC and differential; Future  •  Comprehensive metabolic panel; Future  •  Protime-INR; Future  •  Bilirubin, direct; Future

## 2025-07-02 NOTE — PROGRESS NOTES
Name: Darwin Arellano      : 1965      MRN: [id number]  Encounter Provider: NEELA Sanchez  Encounter Date: 2025   Encounter department: [facility]  :  Assessment & Plan  Steatosis of liver  During today's office visit, I reviewed the patient's previous blood work results from 25 and since they were normal, we will proceed with repeat blood work in December.  We will contact the patient once we have the results of the blood work to review the results and discuss any other treatment plan recommendations.  The patient was agreeable and verbalized an understanding.    NAFLD Fibrosis Score is: -.806    NAFLD Score Correlated Fibrosis Severity   <-1.455 F0-F2   -1.455-0.676 Indeterminate Score   >0.676 F3-F4   **Fibrosis Severity Scale: F0 = no fibrosis; F1= mild fibrosis; F2 = moderate fibrosis; F3 = severe fibrosis; F4 = cirrhosis    NAFLD Score Component Values:  Component Value Date   Age: 60 y.o.     BMI: 31.95 kg/m²    IFG or DM: No    AST: 29 U/L 2025   ALT: 40 U/L 2025   Platelet: 156 Thousands/uL 2025   Albumin: 4.5 g/dL 2025        Fibrosis-4 (FIB-4) Score is: 1.7    Indication for testing Absence of advanced fibrosis Presence of advanced fibrosis Indeterminate result   NAFLD <1.30 >2.67 1.30-2.67   Hepatitis C <1.45 >3.25 1.45-3.25   Hepatitis B <1.00 >2.65 1.00-2.65     FIB-4 Score Component Values:  Component Value Date   Age: 60 y.o.     AST: 29 U/L 2025   Platelet: 156 Thousands/uL 2025   ALT: 40 U/L 2025        Last Liver Imagin2025 right upper quadrant ultrasound.  Repeat Imaging Ordered: No    Continue to work on diet/weight loss.  Continue to eat lower fat, higher fiber foods on a more regular daily basis.   Continue to keep alcohol consumption to a minimum.   Continue to work on work on adequate management of contributing health issues such as: high blood pressure, high cholesterol, diabetes,  etc.     Orders:  •  CBC and differential; Future  •  Comprehensive metabolic panel; Future  •  Protime-INR; Future  •  Bilirubin, direct; Future     Hepatomegaly  Orders:  •  CBC and differential; Future  •  Comprehensive metabolic panel; Future  •  Protime-INR; Future  •  Bilirubin, direct; Future    History of colon polyps  We will hold off on any repeat colonoscopies until the recommended surveillance date unless the patient would start to develop red flag symptoms in the future.  The patient was agreeable and verbalized an understanding.  DUE: 3/5/31    Reviewed GI red flag symptoms with patient today.          Tubular adenoma of colon  See above.       The patient is to schedule a follow up office visit in 1 year, but understands to call or contact our offices with any issues before then or as needed.     History of Present Illness   Darwin Arellano is a 60 y.o. male who presents for a follow-up office visit regarding his steatosis of the liver, hepatomegaly, history of colon polyps, and tubular adenomas of the colon.  Since his last office visit, the patient reports that he has been trying to be more diligent about his diet and weight loss goals and reports that he has cut out soda and desserts and is drinking more water and definitely feels better overall.  He has lost 8 pounds since his last office visit and there has definitely been improvement in his blood work as well.    The patient currently denies any reflux, nausea, dysphagia, vomiting, decreased appetite, unplanned weight loss, or abdominal pain. Water Intake: Adequate per day.    The patient currently reports that they have a BM almost daily and reports that it is relieving, without any consistent diarrhea, constipation, straining, melena or bloody stools. Last BM: This morning. Flatus: Yes.    GI Meds: None    Tobacco: Denied  ETOH: Rarely  Marijuana: Denied  Illicit Drug Use: Denied    Imaging: (5/12/25): Right upper quadrant ultrasound: Mild  "but stable steatosis of the liver.    Endoscopy History: EGD: (3/5/24): Mild gastritis. Path: Positive H. Pylori.     COLONOSCOPY: (3/5/24): 1 tubular adenoma removed with a recommendation of a repeat colonoscopy in 7 years.     DUE: 3/5/31    HPI  History obtained from: patient  Review of Systems A complete review of systems is negative other than that noted above in the HPI.      Current Medications[1]  Objective   /68 (BP Location: Left arm, Patient Position: Sitting, Cuff Size: Standard)   Pulse 78   Temp 97.9 °F (36.6 °C) (Temporal)   Resp 18   Ht 5' 5\" (1.651 m)   Wt 87.1 kg (192 lb)   SpO2 96%   BMI 31.95 kg/m²     Physical Exam   Abdomen is soft, nondistended, nontender, with hypoactive bowel sounds x 4.  No edema noted of the b/l lower extremities upon exam today. Skin is non-icteric.     Lab Results: I personally reviewed relevant lab results.       Results for orders placed during the hospital encounter of 03/05/24    EGD    Impression  Mild abnormal mucosa  The upper third of the esophagus, middle third of the esophagus and lower third of the esophagus appeared normal.  The fundus of the stomach, body of the stomach, incisura, antrum and pylorus appeared normal. Performed random biopsy to rule out H. pylori.      RECOMMENDATION:  Await pathology results  Continue famotidine  Consider checking celiac serologies depending on pathology results  Proceed with colonoscopy            Oleg Reyes DO               [1]  Current Outpatient Medications   Medication Sig Dispense Refill   • simvastatin (ZOCOR) 20 mg tablet Take 1 tablet (20 mg total) by mouth daily 90 tablet 1     No current facility-administered medications for this visit.   "